# Patient Record
Sex: FEMALE | Race: BLACK OR AFRICAN AMERICAN | NOT HISPANIC OR LATINO | Employment: OTHER | URBAN - METROPOLITAN AREA
[De-identification: names, ages, dates, MRNs, and addresses within clinical notes are randomized per-mention and may not be internally consistent; named-entity substitution may affect disease eponyms.]

---

## 2023-01-19 PROBLEM — K59.00 CONSTIPATION: Status: ACTIVE | Noted: 2023-01-19

## 2023-01-19 PROBLEM — R52 BODY ACHES: Status: ACTIVE | Noted: 2023-01-19

## 2023-01-19 PROBLEM — H52.03 HYPEROPIA WITH PRESBYOPIA OF BOTH EYES: Status: ACTIVE | Noted: 2023-01-19

## 2023-01-19 PROBLEM — N17.9 ACUTE KIDNEY INJURY (CMS-HCC): Status: ACTIVE | Noted: 2023-01-19

## 2023-01-19 PROBLEM — H40.1134 PRIMARY OPEN ANGLE GLAUCOMA (POAG) OF BOTH EYES, INDETERMINATE STAGE: Status: ACTIVE | Noted: 2023-01-19

## 2023-01-19 PROBLEM — B96.81 HELICOBACTER PYLORI GASTRITIS: Status: ACTIVE | Noted: 2023-01-19

## 2023-01-19 PROBLEM — E11.65 UNCONTROLLED TYPE 2 DIABETES MELLITUS WITH HYPERGLYCEMIA (MULTI): Status: ACTIVE | Noted: 2023-01-19

## 2023-01-19 PROBLEM — K44.9 HIATAL HERNIA: Status: ACTIVE | Noted: 2023-01-19

## 2023-01-19 PROBLEM — I10 HYPERTENSION: Status: ACTIVE | Noted: 2023-01-19

## 2023-01-19 PROBLEM — M54.50 LOW BACK PAIN: Status: ACTIVE | Noted: 2023-01-19

## 2023-01-19 PROBLEM — K92.0 HEMATEMESIS: Status: ACTIVE | Noted: 2023-01-19

## 2023-01-19 PROBLEM — H26.491 PCO (POSTERIOR CAPSULAR OPACIFICATION), RIGHT: Status: ACTIVE | Noted: 2023-01-19

## 2023-01-19 PROBLEM — D64.9 ANEMIA: Status: ACTIVE | Noted: 2023-01-19

## 2023-01-19 PROBLEM — H04.123 DRY EYES, BILATERAL: Status: ACTIVE | Noted: 2023-01-19

## 2023-01-19 PROBLEM — H25.812 COMBINED FORM OF AGE-RELATED CATARACT, LEFT EYE: Status: ACTIVE | Noted: 2023-01-19

## 2023-01-19 PROBLEM — H26.492 PCO (POSTERIOR CAPSULAR OPACIFICATION), LEFT: Status: ACTIVE | Noted: 2023-01-19

## 2023-01-19 PROBLEM — R05.9 COUGH: Status: ACTIVE | Noted: 2023-01-19

## 2023-01-19 PROBLEM — E66.3 OVERWEIGHT WITH BODY MASS INDEX (BMI) OF 27 TO 27.9 IN ADULT: Status: ACTIVE | Noted: 2023-01-19

## 2023-01-19 PROBLEM — H25.811 COMBINED FORM OF AGE-RELATED CATARACT, RIGHT EYE: Status: ACTIVE | Noted: 2023-01-19

## 2023-01-19 PROBLEM — F41.9 ANXIETY: Status: ACTIVE | Noted: 2023-01-19

## 2023-01-19 PROBLEM — F32.A DEPRESSION: Status: ACTIVE | Noted: 2023-01-19

## 2023-01-19 PROBLEM — K57.90 DIVERTICULOSIS: Status: ACTIVE | Noted: 2023-01-19

## 2023-01-19 PROBLEM — N28.1 SIMPLE RENAL CYST: Status: ACTIVE | Noted: 2023-01-19

## 2023-01-19 PROBLEM — H51.8 DVD (DISSOCIATED VERTICAL DEVIATION): Status: ACTIVE | Noted: 2023-01-19

## 2023-01-19 PROBLEM — Z96.1 PSEUDOPHAKIA OF BOTH EYES: Status: ACTIVE | Noted: 2023-01-19

## 2023-01-19 PROBLEM — H50.011 ESOTROPIA OF RIGHT EYE: Status: ACTIVE | Noted: 2023-01-19

## 2023-01-19 PROBLEM — K29.70 HELICOBACTER PYLORI GASTRITIS: Status: ACTIVE | Noted: 2023-01-19

## 2023-01-19 PROBLEM — E78.5 HYPERLIPIDEMIA: Status: ACTIVE | Noted: 2023-01-19

## 2023-01-19 PROBLEM — J30.9 ALLERGIC RHINITIS: Status: ACTIVE | Noted: 2023-01-19

## 2023-01-19 PROBLEM — H52.4 HYPEROPIA WITH PRESBYOPIA OF BOTH EYES: Status: ACTIVE | Noted: 2023-01-19

## 2023-01-19 PROBLEM — K21.9 GERD (GASTROESOPHAGEAL REFLUX DISEASE): Status: ACTIVE | Noted: 2023-01-19

## 2023-01-19 PROBLEM — M25.50 ARTHRALGIA: Status: ACTIVE | Noted: 2023-01-19

## 2023-01-19 PROBLEM — Z79.4 INSULIN DEPENDENT TYPE 2 DIABETES MELLITUS (MULTI): Status: ACTIVE | Noted: 2023-01-19

## 2023-01-19 PROBLEM — K27.9 PEPTIC ULCER DISEASE: Status: ACTIVE | Noted: 2023-01-19

## 2023-01-19 PROBLEM — N18.30 CKD (CHRONIC KIDNEY DISEASE) STAGE 3, GFR 30-59 ML/MIN (MULTI): Status: ACTIVE | Noted: 2023-01-19

## 2023-01-19 PROBLEM — E11.9 DIABETES MELLITUS (MULTI): Status: ACTIVE | Noted: 2023-01-19

## 2023-01-19 PROBLEM — E11.40 DIABETIC NEUROPATHY (MULTI): Status: ACTIVE | Noted: 2023-01-19

## 2023-01-19 RX ORDER — PEN NEEDLE, DIABETIC 29 G X1/2"
NEEDLE, DISPOSABLE MISCELLANEOUS
COMMUNITY

## 2023-01-19 RX ORDER — BENZONATATE 100 MG/1
100 CAPSULE ORAL 3 TIMES DAILY PRN
COMMUNITY
Start: 2022-01-14 | End: 2023-03-07 | Stop reason: SDUPTHER

## 2023-01-19 RX ORDER — SODIUM BICARBONATE 650 MG/1
650 TABLET ORAL 2 TIMES DAILY
COMMUNITY
Start: 2021-10-27 | End: 2024-02-07 | Stop reason: ALTCHOICE

## 2023-01-19 RX ORDER — DOCUSATE SODIUM 100 MG/1
100 CAPSULE, LIQUID FILLED ORAL 2 TIMES DAILY PRN
COMMUNITY
Start: 2022-02-10

## 2023-01-19 RX ORDER — BLOOD SUGAR DIAGNOSTIC
STRIP MISCELLANEOUS
COMMUNITY
Start: 2022-04-01

## 2023-01-19 RX ORDER — GABAPENTIN 100 MG/1
200 CAPSULE ORAL NIGHTLY
COMMUNITY
Start: 2021-03-19

## 2023-01-19 RX ORDER — DULAGLUTIDE 1.5 MG/.5ML
INJECTION, SOLUTION SUBCUTANEOUS
COMMUNITY
Start: 2021-12-13 | End: 2023-03-07 | Stop reason: ALTCHOICE

## 2023-01-19 RX ORDER — PANTOPRAZOLE SODIUM 40 MG/1
40 TABLET, DELAYED RELEASE ORAL EVERY 12 HOURS
COMMUNITY
Start: 2022-01-14 | End: 2023-04-18 | Stop reason: SDUPTHER

## 2023-01-19 RX ORDER — ALBUTEROL SULFATE 90 UG/1
2 AEROSOL, METERED RESPIRATORY (INHALATION) EVERY 4 HOURS PRN
COMMUNITY
Start: 2022-05-17 | End: 2024-05-22 | Stop reason: SDUPTHER

## 2023-01-19 RX ORDER — DEXTROMETHORPHAN HYDROBROMIDE, GUAIFENESIN 5; 100 MG/5ML; MG/5ML
650 LIQUID ORAL EVERY 8 HOURS
COMMUNITY
End: 2023-03-07 | Stop reason: SDUPTHER

## 2023-01-19 RX ORDER — HYDROCHLOROTHIAZIDE 25 MG/1
12.5 TABLET ORAL DAILY
COMMUNITY
Start: 2013-01-23 | End: 2023-06-07 | Stop reason: SDUPTHER

## 2023-01-19 RX ORDER — ASPIRIN 81 MG/1
1 TABLET ORAL DAILY
COMMUNITY
Start: 2019-01-02 | End: 2023-03-07 | Stop reason: ALTCHOICE

## 2023-01-19 RX ORDER — LIDOCAINE 50 MG/G
OINTMENT TOPICAL AS NEEDED
COMMUNITY

## 2023-01-19 RX ORDER — LISINOPRIL 40 MG/1
1 TABLET ORAL DAILY
COMMUNITY
Start: 2013-01-23 | End: 2023-06-08

## 2023-01-19 RX ORDER — ATORVASTATIN CALCIUM 20 MG/1
20 TABLET, FILM COATED ORAL NIGHTLY
COMMUNITY
Start: 2017-09-21 | End: 2023-06-07 | Stop reason: SDUPTHER

## 2023-03-03 PROBLEM — I12.9 HYPERTENSIVE CKD (CHRONIC KIDNEY DISEASE): Status: ACTIVE | Noted: 2023-03-03

## 2023-03-07 ENCOUNTER — LAB (OUTPATIENT)
Dept: LAB | Facility: LAB | Age: 63
End: 2023-03-07
Payer: COMMERCIAL

## 2023-03-07 ENCOUNTER — OFFICE VISIT (OUTPATIENT)
Dept: PRIMARY CARE | Facility: CLINIC | Age: 63
End: 2023-03-07
Payer: COMMERCIAL

## 2023-03-07 VITALS
TEMPERATURE: 97.2 F | HEART RATE: 85 BPM | DIASTOLIC BLOOD PRESSURE: 76 MMHG | SYSTOLIC BLOOD PRESSURE: 116 MMHG | HEIGHT: 59 IN | OXYGEN SATURATION: 98 % | WEIGHT: 129.4 LBS | BODY MASS INDEX: 26.08 KG/M2

## 2023-03-07 DIAGNOSIS — M54.50 CHRONIC MIDLINE LOW BACK PAIN WITHOUT SCIATICA: ICD-10-CM

## 2023-03-07 DIAGNOSIS — G89.29 CHRONIC MIDLINE LOW BACK PAIN WITHOUT SCIATICA: ICD-10-CM

## 2023-03-07 DIAGNOSIS — E11.65 UNCONTROLLED TYPE 2 DIABETES MELLITUS WITH HYPERGLYCEMIA (MULTI): Primary | ICD-10-CM

## 2023-03-07 DIAGNOSIS — E11.65 UNCONTROLLED TYPE 2 DIABETES MELLITUS WITH HYPERGLYCEMIA (MULTI): ICD-10-CM

## 2023-03-07 DIAGNOSIS — N18.31 STAGE 3A CHRONIC KIDNEY DISEASE (MULTI): ICD-10-CM

## 2023-03-07 DIAGNOSIS — R05.2 SUBACUTE COUGH: ICD-10-CM

## 2023-03-07 LAB
ALBUMIN (MG/L) IN URINE: 21.9 MG/L
ALBUMIN/CREATININE (UG/MG) IN URINE: 9.2 UG/MG CRT (ref 0–30)
CREATININE (MG/DL) IN URINE: 238 MG/DL (ref 20–320)

## 2023-03-07 PROCEDURE — 3044F HG A1C LEVEL LT 7.0%: CPT | Performed by: STUDENT IN AN ORGANIZED HEALTH CARE EDUCATION/TRAINING PROGRAM

## 2023-03-07 PROCEDURE — 36415 COLL VENOUS BLD VENIPUNCTURE: CPT

## 2023-03-07 PROCEDURE — 82043 UR ALBUMIN QUANTITATIVE: CPT

## 2023-03-07 PROCEDURE — 3074F SYST BP LT 130 MM HG: CPT | Performed by: STUDENT IN AN ORGANIZED HEALTH CARE EDUCATION/TRAINING PROGRAM

## 2023-03-07 PROCEDURE — 3078F DIAST BP <80 MM HG: CPT | Performed by: STUDENT IN AN ORGANIZED HEALTH CARE EDUCATION/TRAINING PROGRAM

## 2023-03-07 PROCEDURE — 83036 HEMOGLOBIN GLYCOSYLATED A1C: CPT

## 2023-03-07 PROCEDURE — 99214 OFFICE O/P EST MOD 30 MIN: CPT | Performed by: STUDENT IN AN ORGANIZED HEALTH CARE EDUCATION/TRAINING PROGRAM

## 2023-03-07 PROCEDURE — 3066F NEPHROPATHY DOC TX: CPT | Performed by: STUDENT IN AN ORGANIZED HEALTH CARE EDUCATION/TRAINING PROGRAM

## 2023-03-07 PROCEDURE — 4010F ACE/ARB THERAPY RXD/TAKEN: CPT | Performed by: STUDENT IN AN ORGANIZED HEALTH CARE EDUCATION/TRAINING PROGRAM

## 2023-03-07 PROCEDURE — 82570 ASSAY OF URINE CREATININE: CPT

## 2023-03-07 RX ORDER — DULAGLUTIDE 3 MG/.5ML
3 INJECTION, SOLUTION SUBCUTANEOUS
Qty: 0.5 ML | Refills: 11 | Status: SHIPPED | OUTPATIENT
Start: 2023-03-07 | End: 2024-02-07 | Stop reason: SDUPTHER

## 2023-03-07 RX ORDER — BENZONATATE 100 MG/1
100 CAPSULE ORAL 3 TIMES DAILY PRN
Qty: 20 CAPSULE | Refills: 1 | Status: SHIPPED | OUTPATIENT
Start: 2023-03-07 | End: 2024-02-07 | Stop reason: ALTCHOICE

## 2023-03-07 RX ORDER — DEXTROMETHORPHAN HYDROBROMIDE, GUAIFENESIN 5; 100 MG/5ML; MG/5ML
650 LIQUID ORAL EVERY 8 HOURS
Qty: 30 TABLET | Refills: 3 | Status: SHIPPED | OUTPATIENT
Start: 2023-03-07 | End: 2023-08-11

## 2023-03-07 ASSESSMENT — PAIN SCALES - GENERAL: PAINLEVEL: 0-NO PAIN

## 2023-03-07 NOTE — PROGRESS NOTES
"03/08/23    Subjective   Patient ID: Mari Dowling is a 63 y.o. female who presents for Follow-up.    HPI    Diabetes  Lab Results   Component Value Date    HGBA1C 5.4 11/10/2022   - jardiance and trulicity  - doesn't miss any doses  - needs trulicity refills  - denies CP, SOB, LE edema      Objective   /76 (BP Location: Right arm, Patient Position: Sitting, BP Cuff Size: Adult)   Pulse 85   Temp 36.2 °C (97.2 °F) (Temporal)   Ht 1.499 m (4' 11\")   Wt 58.7 kg (129 lb 6.4 oz)   SpO2 98%   BMI 26.14 kg/m²     Physical Exam    General: well-appearing, NAD  HEENT: NC/AT  Lungs: normal work of breathing  Neuro: grossly intact  Psych: no depression, anxiety      Assessment/Plan   Problem List Items Addressed This Visit          Respiratory    Cough    Relevant Medications    benzonatate (Tessalon) 100 mg capsule       Genitourinary    CKD (chronic kidney disease) stage 3, GFR 30-59 ml/min (CMS/HCC)    Relevant Medications    empagliflozin (Jardiance) 10 mg    Other Relevant Orders    Albumin , Urine Random (Completed)       Endocrine/Metabolic    Uncontrolled type 2 diabetes mellitus with hyperglycemia (CMS/[facility]) - Primary    Relevant Medications    empagliflozin (Jardiance) 10 mg    dulaglutide (Trulicity) 3 mg/0.5 mL pen injector    Other Relevant Orders    Hemoglobin A1C    Albumin , Urine Random (Completed)       Other    Low back pain    Relevant Medications    acetaminophen (Tylenol 8 Hour) 650 mg ER tablet     Post-viral cough  - chronic, stable  - refill sent for tessalon perles    Low back pain  - chronic, stable  - refill sent for ER tylenol    Diabetes  - chronic, stable  - check A1c and urine albumin  - very well-controlled A1c  - has CKD, but will decrease jardiance to 10 mg and increase trulicity to 3 mg weekly, as patient still has overweight BMI and would continue to benefit from weight loss effects and cardiovascular protection  - consider dc jardiance at next visit, also could discuss reducing " Caller would like to discuss an/a Appointment for 07/31. Writer advised caller of callback within 24-72 hours.    Patient Name: Kaela Ko  Caller Name:   Name of Facility:   Callback Number: [phone]  Best Availability: any  Can A Detailed Message Be left? yes  Fax Number:   Additional Info: Patient called back for Covid-19 screening questions. Patient was tested positive and will be 14 days out at the 07/31 heide. Screened neg today on phone, as she does not have symptoms anymore.     Patient also asking to give Ni Reddy a heads up that she will need another Covid-19 test, as she must have a negative test to be able to return to work.    Please call if there are any questions, otherwise patient will be at 07/31 appt.  Did you confirm the message with the caller?: yes    Thank you,  Kelsey Hardwick     BP meds given very well-controlled BP  - stopped ASA    RTC in about 3 months for DM fu or sooner as needed    Russell Ramos MD

## 2023-03-09 LAB
ESTIMATED AVERAGE GLUCOSE FOR HBA1C: 105 MG/DL
HEMOGLOBIN A1C/HEMOGLOBIN TOTAL IN BLOOD: 5.3 %

## 2023-03-13 ENCOUNTER — TELEPHONE (OUTPATIENT)
Dept: PRIMARY CARE | Facility: CLINIC | Age: 63
End: 2023-03-13

## 2023-04-17 DIAGNOSIS — K21.9 GASTROESOPHAGEAL REFLUX DISEASE, UNSPECIFIED WHETHER ESOPHAGITIS PRESENT: Primary | ICD-10-CM

## 2023-04-24 RX ORDER — PANTOPRAZOLE SODIUM 40 MG/1
40 TABLET, DELAYED RELEASE ORAL
Qty: 90 TABLET | Refills: 3 | Status: SHIPPED | OUTPATIENT
Start: 2023-04-24 | End: 2023-06-07 | Stop reason: SDUPTHER

## 2023-06-07 ENCOUNTER — OFFICE VISIT (OUTPATIENT)
Dept: PRIMARY CARE | Facility: CLINIC | Age: 63
End: 2023-06-07
Payer: COMMERCIAL

## 2023-06-07 ENCOUNTER — LAB (OUTPATIENT)
Dept: LAB | Facility: LAB | Age: 63
End: 2023-06-07
Payer: COMMERCIAL

## 2023-06-07 VITALS
SYSTOLIC BLOOD PRESSURE: 130 MMHG | WEIGHT: 129.8 LBS | HEART RATE: 80 BPM | BODY MASS INDEX: 26.22 KG/M2 | OXYGEN SATURATION: 99 % | TEMPERATURE: 97.1 F | DIASTOLIC BLOOD PRESSURE: 79 MMHG

## 2023-06-07 DIAGNOSIS — E11.65 UNCONTROLLED TYPE 2 DIABETES MELLITUS WITH HYPERGLYCEMIA (MULTI): ICD-10-CM

## 2023-06-07 DIAGNOSIS — K21.9 GASTROESOPHAGEAL REFLUX DISEASE, UNSPECIFIED WHETHER ESOPHAGITIS PRESENT: ICD-10-CM

## 2023-06-07 DIAGNOSIS — I10 HYPERTENSION, UNSPECIFIED TYPE: ICD-10-CM

## 2023-06-07 DIAGNOSIS — R19.7 DIARRHEA, UNSPECIFIED TYPE: ICD-10-CM

## 2023-06-07 DIAGNOSIS — Z79.4 INSULIN DEPENDENT TYPE 2 DIABETES MELLITUS (MULTI): ICD-10-CM

## 2023-06-07 DIAGNOSIS — E11.9 INSULIN DEPENDENT TYPE 2 DIABETES MELLITUS (MULTI): ICD-10-CM

## 2023-06-07 DIAGNOSIS — R19.7 DIARRHEA, UNSPECIFIED TYPE: Primary | ICD-10-CM

## 2023-06-07 LAB
ALANINE AMINOTRANSFERASE (SGPT) (U/L) IN SER/PLAS: 21 U/L (ref 7–45)
ALBUMIN (G/DL) IN SER/PLAS: 4.4 G/DL (ref 3.4–5)
ALKALINE PHOSPHATASE (U/L) IN SER/PLAS: 92 U/L (ref 33–136)
ANION GAP IN SER/PLAS: 20 MMOL/L (ref 10–20)
ASPARTATE AMINOTRANSFERASE (SGOT) (U/L) IN SER/PLAS: 27 U/L (ref 9–39)
BILIRUBIN TOTAL (MG/DL) IN SER/PLAS: 0.4 MG/DL (ref 0–1.2)
CALCIUM (MG/DL) IN SER/PLAS: 9.4 MG/DL (ref 8.6–10.6)
CARBON DIOXIDE, TOTAL (MMOL/L) IN SER/PLAS: 23 MMOL/L (ref 21–32)
CHLORIDE (MMOL/L) IN SER/PLAS: 107 MMOL/L (ref 98–107)
CREATININE (MG/DL) IN SER/PLAS: 1.68 MG/DL (ref 0.5–1.05)
ESTIMATED AVERAGE GLUCOSE FOR HBA1C: 114 MG/DL
GFR FEMALE: 34 ML/MIN/1.73M2
GLUCOSE (MG/DL) IN SER/PLAS: 68 MG/DL (ref 74–99)
HEMOGLOBIN A1C/HEMOGLOBIN TOTAL IN BLOOD: 5.6 %
POTASSIUM (MMOL/L) IN SER/PLAS: 4.5 MMOL/L (ref 3.5–5.3)
PROTEIN TOTAL: 8.1 G/DL (ref 6.4–8.2)
SODIUM (MMOL/L) IN SER/PLAS: 145 MMOL/L (ref 136–145)
UREA NITROGEN (MG/DL) IN SER/PLAS: 31 MG/DL (ref 6–23)

## 2023-06-07 PROCEDURE — 3078F DIAST BP <80 MM HG: CPT

## 2023-06-07 PROCEDURE — 3075F SYST BP GE 130 - 139MM HG: CPT

## 2023-06-07 PROCEDURE — 36415 COLL VENOUS BLD VENIPUNCTURE: CPT

## 2023-06-07 PROCEDURE — 80053 COMPREHEN METABOLIC PANEL: CPT

## 2023-06-07 PROCEDURE — 4010F ACE/ARB THERAPY RXD/TAKEN: CPT

## 2023-06-07 PROCEDURE — 3044F HG A1C LEVEL LT 7.0%: CPT

## 2023-06-07 PROCEDURE — 83036 HEMOGLOBIN GLYCOSYLATED A1C: CPT

## 2023-06-07 PROCEDURE — 1036F TOBACCO NON-USER: CPT

## 2023-06-07 PROCEDURE — 99214 OFFICE O/P EST MOD 30 MIN: CPT

## 2023-06-07 PROCEDURE — 3066F NEPHROPATHY DOC TX: CPT

## 2023-06-07 RX ORDER — HYDROCHLOROTHIAZIDE 25 MG/1
12.5 TABLET ORAL DAILY
Qty: 90 TABLET | Refills: 3 | Status: SHIPPED | OUTPATIENT
Start: 2023-06-07 | End: 2024-04-08 | Stop reason: SDUPTHER

## 2023-06-07 RX ORDER — PANTOPRAZOLE SODIUM 40 MG/1
40 TABLET, DELAYED RELEASE ORAL
Qty: 90 TABLET | Refills: 3 | Status: SHIPPED | OUTPATIENT
Start: 2023-06-07

## 2023-06-07 RX ORDER — ATORVASTATIN CALCIUM 20 MG/1
20 TABLET, FILM COATED ORAL NIGHTLY
Qty: 90 TABLET | Refills: 3 | Status: SHIPPED | OUTPATIENT
Start: 2023-06-07 | End: 2024-03-05 | Stop reason: SDUPTHER

## 2023-06-07 ASSESSMENT — PAIN SCALES - GENERAL: PAINLEVEL: 0-NO PAIN

## 2023-06-07 NOTE — PROGRESS NOTES
Subjective   Patient ID: Mari Dowling is a 63 y.o. female who presents for Follow-up.    HPI   63 yo here for diarrhea  - watery, 6-7 episodes a day  - has been ongoing for more than 5 months but has not discussed with a provider  - started on trulicity relatively recently, and has been up-titrated to max dose, not willing to discontinue  - was told may stop jardiance at this visit   - no fevers or blood in diarrhea, nobody else has similar symptoms, no change in diet or weight changes, and has not noticed any specific triggers    Review of Systems  Ten point review of systems negative unless specified in HPI.      Objective   /79 (BP Location: Right arm, Patient Position: Sitting, BP Cuff Size: Adult)   Pulse 80   Temp 36.2 °C (97.1 °F) (Temporal)   Wt 58.9 kg (129 lb 12.8 oz)   SpO2 99%   BMI 26.22 kg/m²     Physical Exam  General:  Well developed. Alert. No acute distress.  Skin:  Warm, dry. normal skin turgor. no rashes. no lesions.   Head: NCAT  EENT: MMM  Cardiovascular:  Regular rate and rhythm, normal S1 and S2, no gallops, no murmurs and no pericardial rub.  Pulmonary:  CTAB in all fields  Abdomen:  (+) BS. soft. non-tender. non-distended. no abdominal masses. no guarding or rigidity.  Neurologic:  grossly intact  Extremities: cap refill < 2 sec bilaterally  Musculoskeletal: moves all extremities spontaneously  Psychiatric:  appropriate mood and affect    Assessment/Plan   64 yo F presenting with chronic watery diarrhea. No red flags in history with unremarkable exam. Vitals also stable, and no significant weight loss over time of complaint. Colonoscopy screening UTD, will rule out infectious causes, and occult blood loss.     Chronic Diarrhea, unspecified type  -     Comprehensive metabolic panel; Future  -     Stool Pathogen Panel, PCR; Future  -     Occult blood x 1, stool; Future  -     Ova/Para + Giardia/Cryptosporidium Antigen; Future  - Return precautions provided    Gastroesophageal  reflux disease, unspecified whether esophagitis present  -     pantoprazole (ProtoNix) 40 mg EC tablet; Take 1 tablet (40 mg) by mouth once daily in the morning. Take before meals.    Hypertension, unspecified type  -     hydroCHLOROthiazide (HYDRODiuril) 25 mg tablet; Take 0.5 tablets (12.5 mg) by mouth once daily.    Insulin dependent type 2 diabetes mellitus (CMS/[facility])  -     atorvastatin (Lipitor) 20 mg tablet; Take 1 tablet (20 mg) by mouth once daily at bedtime.  -  HbA1c    Patient discussed and seen with Attending, Dr Mer Keith.   Lori Barillas MD  PGY-1, Family Medicine.

## 2023-06-08 NOTE — PROGRESS NOTES
Ms Dowling is a 62 yo F who came to the clinic presenting with chronic diarrhea for the past year. Of note, symptoms started around the time patient began taking some of her DM medications (trulicity and Jardiance). Patient's symptoms are likely 2/2 to medications AE given history and timeline. Other differentials include parasitic/bacterial infection, IBS. Will get stool studies. Hold off on imaging or colonoscopy for now. Meanwhile, will decrease DM dosages and reassess symptoms. C/w immodium for symptomatic relief. Agree with rest of Dr. Barillas's A/P.     Discussed with Dr. Sagar Sommer, DO   PGY-3 Family Medicine

## 2023-06-14 NOTE — PROGRESS NOTES
I saw and evaluated the patient. I personally obtained the key and critical portions of the history and physical exam or was physically present for key and critical portions performed by the resident/fellow. I reviewed the resident/fellow's documentation and discussed the patient with the resident/fellow. I agree with the resident/fellow's medical decision making as documented in the note with the exception/addition of the following:  Jiardance may be implicated in diarrhea if workup is otherwise negative, and  if A1C is elevated may need some shared decision-making about effective treatment with less GI side effect     Mer Keith MD

## 2023-08-04 DIAGNOSIS — G89.29 CHRONIC MIDLINE LOW BACK PAIN WITHOUT SCIATICA: ICD-10-CM

## 2023-08-04 DIAGNOSIS — M54.50 CHRONIC MIDLINE LOW BACK PAIN WITHOUT SCIATICA: ICD-10-CM

## 2023-08-11 RX ORDER — DEXTROSE (GLUCOSE), LEVULOSE (FRUCTOSE), PHOSPHORIC ACID 1.87; 1.87; 21.5 G/5ML; G/5ML; MG/5ML
SOLUTION ORAL
Qty: 30 TABLET | Refills: 3 | Status: SHIPPED | OUTPATIENT
Start: 2023-08-11

## 2023-09-05 DIAGNOSIS — I10 PRIMARY HYPERTENSION: ICD-10-CM

## 2023-09-05 RX ORDER — LISINOPRIL 40 MG/1
TABLET ORAL
Qty: 90 TABLET | Refills: 0 | Status: SHIPPED | OUTPATIENT
Start: 2023-09-05 | End: 2023-12-19

## 2023-11-13 ENCOUNTER — APPOINTMENT (OUTPATIENT)
Dept: RADIOLOGY | Facility: HOSPITAL | Age: 63
End: 2023-11-13
Payer: COMMERCIAL

## 2023-11-13 ENCOUNTER — HOSPITAL ENCOUNTER (EMERGENCY)
Facility: HOSPITAL | Age: 63
Discharge: HOME | End: 2023-11-13
Payer: COMMERCIAL

## 2023-11-13 VITALS
BODY MASS INDEX: 25.8 KG/M2 | HEIGHT: 59 IN | RESPIRATION RATE: 16 BRPM | HEART RATE: 82 BPM | WEIGHT: 128 LBS | TEMPERATURE: 97.7 F | DIASTOLIC BLOOD PRESSURE: 80 MMHG | SYSTOLIC BLOOD PRESSURE: 131 MMHG | OXYGEN SATURATION: 100 %

## 2023-11-13 DIAGNOSIS — B34.9 VIRAL ILLNESS: Primary | ICD-10-CM

## 2023-11-13 LAB
ALBUMIN SERPL BCP-MCNC: 4.3 G/DL (ref 3.4–5)
ALP SERPL-CCNC: 75 U/L (ref 33–136)
ALT SERPL W P-5'-P-CCNC: 16 U/L (ref 7–45)
ANION GAP SERPL CALC-SCNC: 15 MMOL/L (ref 10–20)
AST SERPL W P-5'-P-CCNC: 20 U/L (ref 9–39)
BILIRUB SERPL-MCNC: 0.3 MG/DL (ref 0–1.2)
BUN SERPL-MCNC: 19 MG/DL (ref 6–23)
CALCIUM SERPL-MCNC: 9.5 MG/DL (ref 8.6–10.6)
CHLORIDE SERPL-SCNC: 104 MMOL/L (ref 98–107)
CO2 SERPL-SCNC: 22 MMOL/L (ref 21–32)
CREAT SERPL-MCNC: 1.35 MG/DL (ref 0.5–1.05)
ERYTHROCYTE [DISTWIDTH] IN BLOOD BY AUTOMATED COUNT: 15.9 % (ref 11.5–14.5)
FLUAV RNA RESP QL NAA+PROBE: NOT DETECTED
FLUBV RNA RESP QL NAA+PROBE: NOT DETECTED
GFR SERPL CREATININE-BSD FRML MDRD: 44 ML/MIN/1.73M*2
GLUCOSE SERPL-MCNC: 87 MG/DL (ref 74–99)
HCT VFR BLD AUTO: 32.7 % (ref 36–46)
HGB BLD-MCNC: 9.6 G/DL (ref 12–16)
MAGNESIUM SERPL-MCNC: 2.11 MG/DL (ref 1.6–2.4)
MCH RBC QN AUTO: 25.8 PG (ref 26–34)
MCHC RBC AUTO-ENTMCNC: 29.4 G/DL (ref 32–36)
MCV RBC AUTO: 88 FL (ref 80–100)
NRBC BLD-RTO: 0 /100 WBCS (ref 0–0)
PLATELET # BLD AUTO: 493 X10*3/UL (ref 150–450)
POTASSIUM SERPL-SCNC: 4.2 MMOL/L (ref 3.5–5.3)
PROT SERPL-MCNC: 8.8 G/DL (ref 6.4–8.2)
RBC # BLD AUTO: 3.72 X10*6/UL (ref 4–5.2)
SARS-COV-2 RNA RESP QL NAA+PROBE: NOT DETECTED
SODIUM SERPL-SCNC: 137 MMOL/L (ref 136–145)
WBC # BLD AUTO: 10.6 X10*3/UL (ref 4.4–11.3)

## 2023-11-13 PROCEDURE — 87636 SARSCOV2 & INF A&B AMP PRB: CPT | Performed by: PHYSICIAN ASSISTANT

## 2023-11-13 PROCEDURE — 96374 THER/PROPH/DIAG INJ IV PUSH: CPT

## 2023-11-13 PROCEDURE — 96361 HYDRATE IV INFUSION ADD-ON: CPT

## 2023-11-13 PROCEDURE — 85027 COMPLETE CBC AUTOMATED: CPT | Performed by: EMERGENCY MEDICINE

## 2023-11-13 PROCEDURE — 71046 X-RAY EXAM CHEST 2 VIEWS: CPT

## 2023-11-13 PROCEDURE — 83735 ASSAY OF MAGNESIUM: CPT | Performed by: EMERGENCY MEDICINE

## 2023-11-13 PROCEDURE — 80053 COMPREHEN METABOLIC PANEL: CPT | Performed by: EMERGENCY MEDICINE

## 2023-11-13 PROCEDURE — 2500000001 HC RX 250 WO HCPCS SELF ADMINISTERED DRUGS (ALT 637 FOR MEDICARE OP): Performed by: PHYSICIAN ASSISTANT

## 2023-11-13 PROCEDURE — 96375 TX/PRO/DX INJ NEW DRUG ADDON: CPT

## 2023-11-13 PROCEDURE — 99284 EMERGENCY DEPT VISIT MOD MDM: CPT | Mod: 25

## 2023-11-13 PROCEDURE — 2500000004 HC RX 250 GENERAL PHARMACY W/ HCPCS (ALT 636 FOR OP/ED): Performed by: PHYSICIAN ASSISTANT

## 2023-11-13 PROCEDURE — 36415 COLL VENOUS BLD VENIPUNCTURE: CPT | Performed by: EMERGENCY MEDICINE

## 2023-11-13 PROCEDURE — 99284 EMERGENCY DEPT VISIT MOD MDM: CPT | Performed by: PHYSICIAN ASSISTANT

## 2023-11-13 RX ORDER — KETOROLAC TROMETHAMINE 15 MG/ML
15 INJECTION, SOLUTION INTRAMUSCULAR; INTRAVENOUS ONCE
Status: COMPLETED | OUTPATIENT
Start: 2023-11-13 | End: 2023-11-13

## 2023-11-13 RX ORDER — BENZONATATE 100 MG/1
100 CAPSULE ORAL ONCE
Status: COMPLETED | OUTPATIENT
Start: 2023-11-13 | End: 2023-11-13

## 2023-11-13 RX ORDER — ONDANSETRON 4 MG/1
4 TABLET, ORALLY DISINTEGRATING ORAL EVERY 6 HOURS PRN
Qty: 15 TABLET | Refills: 0 | Status: SHIPPED | OUTPATIENT
Start: 2023-11-13 | End: 2023-11-18

## 2023-11-13 RX ORDER — ONDANSETRON HYDROCHLORIDE 2 MG/ML
4 INJECTION, SOLUTION INTRAVENOUS ONCE
Status: COMPLETED | OUTPATIENT
Start: 2023-11-13 | End: 2023-11-13

## 2023-11-13 RX ORDER — KETOROLAC TROMETHAMINE 10 MG/1
10 TABLET, FILM COATED ORAL EVERY 6 HOURS PRN
Qty: 20 TABLET | Refills: 0 | Status: SHIPPED | OUTPATIENT
Start: 2023-11-13 | End: 2024-02-07 | Stop reason: WASHOUT

## 2023-11-13 RX ORDER — ACETAMINOPHEN 325 MG/1
975 TABLET ORAL ONCE
Status: COMPLETED | OUTPATIENT
Start: 2023-11-13 | End: 2023-11-13

## 2023-11-13 RX ORDER — BENZONATATE 100 MG/1
100 CAPSULE ORAL EVERY 8 HOURS PRN
Qty: 21 CAPSULE | Refills: 0 | Status: SHIPPED | OUTPATIENT
Start: 2023-11-13 | End: 2023-11-20

## 2023-11-13 RX ADMIN — ACETAMINOPHEN 975 MG: 325 TABLET ORAL at 13:52

## 2023-11-13 RX ADMIN — ONDANSETRON 4 MG: 2 INJECTION INTRAMUSCULAR; INTRAVENOUS at 13:52

## 2023-11-13 RX ADMIN — BENZONATATE 100 MG: 100 CAPSULE ORAL at 13:52

## 2023-11-13 RX ADMIN — SODIUM CHLORIDE, SODIUM LACTATE, POTASSIUM CHLORIDE, AND CALCIUM CHLORIDE 1000 ML: 600; 310; 30; 20 INJECTION, SOLUTION INTRAVENOUS at 13:45

## 2023-11-13 RX ADMIN — KETOROLAC TROMETHAMINE 15 MG: 15 INJECTION, SOLUTION INTRAMUSCULAR; INTRAVENOUS at 13:52

## 2023-11-13 ASSESSMENT — COLUMBIA-SUICIDE SEVERITY RATING SCALE - C-SSRS
1. IN THE PAST MONTH, HAVE YOU WISHED YOU WERE DEAD OR WISHED YOU COULD GO TO SLEEP AND NOT WAKE UP?: NO
2. HAVE YOU ACTUALLY HAD ANY THOUGHTS OF KILLING YOURSELF?: NO
6. HAVE YOU EVER DONE ANYTHING, STARTED TO DO ANYTHING, OR PREPARED TO DO ANYTHING TO END YOUR LIFE?: NO

## 2023-11-13 ASSESSMENT — PAIN - FUNCTIONAL ASSESSMENT
PAIN_FUNCTIONAL_ASSESSMENT: 0-10
PAIN_FUNCTIONAL_ASSESSMENT: 0-10

## 2023-11-13 ASSESSMENT — PAIN SCALES - GENERAL
PAINLEVEL_OUTOF10: 10 - WORST POSSIBLE PAIN
PAINLEVEL_OUTOF10: 10 - WORST POSSIBLE PAIN

## 2023-11-13 NOTE — ED PROVIDER NOTES
HPI   Chief Complaint   Patient presents with    Flu Symptoms    Back Pain       This is a 63-year-old female with a history of diabetes on Trulicity, hypertension, GERD, chronic diarrhea, CKD 3 who presents to the emergency department with concern for an approximate week history of diffuse back pain, productive cough, rhinorrhea, fatigue, headache, nausea without emesis.  Denies any recent sick contact or travel.  Has been taking Tylenol without adequate relief of symptoms.  She endorses chronic diarrhea no different than usual for which she takes Imodium.  She has no fever, chills, chest pain, shortness of breath, loss of taste or smell, abdominal pain, urinary symptoms.                          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    Alternating esotropia    Cortical age-related cataract, bilateral 11/29/2016    Cortical age-related cataract of both eyes    Encounter for follow-up examination after completed treatment for conditions other than malignant neoplasm 12/08/2020    Hospital discharge follow-up    Encounter for general adult medical examination without abnormal findings 04/01/2022    Encounter for annual wellness visit (AWV) in Medicare patient    Encounter for immunization 03/12/2021    Immunization due    Encounter for issue of repeat prescription 04/25/2017    Medication refill    Encounter for other screening for malignant neoplasm of breast 11/05/2018    Screening for breast cancer    Essential (primary) hypertension 04/30/2021    Hypertension    Foreign body in right ear, initial encounter 04/25/2017    Foreign body of right ear, initial encounter    Impacted cerumen, right ear 04/25/2017    Impacted cerumen of right ear    Otalgia, right ear 10/26/2016    Otalgia, right    Other retention of urine 01/11/2021    Acute urinary  retention    Other specified health status 2019    Measles, mumps, rubella (MMR) vaccination status unknown    Pain in right elbow 2017    Right elbow pain    Personal history of other diseases of the digestive system 2018    History of gastroenteritis    Personal history of other diseases of the nervous system and sense organs 2016    History of hyperopia    Personal history of other drug therapy 10/15/2019    History of influenza vaccination    Personal history of urinary (tract) infections 2017    History of UTI    Presbyopia 2016    Presbyopia OU    Type 2 diabetes mellitus without complications (CMS/HCC) 10/11/2015    Diabetes mellitus type 2, controlled    Unspecified astigmatism, bilateral 2022    Astigmatism, bilateral    Unspecified disorder of refraction 10/13/2014    Refractive error    Viral infection, unspecified 2020    Nonspecific syndrome suggestive of viral illness     Past Surgical History:   Procedure Laterality Date    CATARACT EXTRACTION  2017    Cataract Extraction    HYSTERECTOMY  2018    Hysterectomy    OTHER SURGICAL HISTORY  2021     section     Family History   Problem Relation Name Age of Onset    Hypertension Mother      Lung cancer Father      Other (Diabetes Mellitus) Sister      Colon cancer Father's Sister      Colon cancer Paternal Grandmother      Breast cancer Other Aunt      Social History     Tobacco Use    Smoking status: Never    Smokeless tobacco: Never   Substance Use Topics    Alcohol use: Never    Drug use: Never       Physical Exam   ED Triage Vitals   Temp Heart Rate Resp BP   23 1158 23 1158 23 1158 23 1201   36.5 °C (97.7 °F) 82 16 131/80      SpO2 Temp src Heart Rate Source Patient Position   23 1158 -- -- --   100 %         BP Location FiO2 (%)     -- --             Physical Exam  Vitals reviewed.   Constitutional:       Appearance: Normal appearance.   HENT:       Head: Normocephalic and atraumatic.      Nose: Congestion present.   Eyes:      Extraocular Movements: Extraocular movements intact.      Pupils: Pupils are equal, round, and reactive to light.   Cardiovascular:      Rate and Rhythm: Normal rate and regular rhythm.      Heart sounds: Normal heart sounds.   Pulmonary:      Effort: Pulmonary effort is normal.      Breath sounds: Normal breath sounds.   Abdominal:      General: There is no distension.      Palpations: Abdomen is soft.      Tenderness: There is no abdominal tenderness.   Musculoskeletal:      Cervical back: Normal range of motion and neck supple.      Comments: Diffuse bilateral parathoracic/paralumbar tenderness, full range of motion of all 4 extremities with equal strength, ambulating independently   Neurological:      Mental Status: She is alert.         ED Course & MDM   Diagnoses as of 11/14/23 1710   Viral illness       Medical Decision Making  63-year-old female, is alert and oriented x3, afebrile and hemodynamically stable presenting with multiple symptoms as per HPI.    The patient is otherwise in no apparent distress, speaking full sentences.  Cardiac exam regular rate and rhythm without any murmurs, rubs or gallops.  Chest clear bilaterally.  Abdomen is soft and nontender.    The patient was given Toradol, acetaminophen, benzonatate, Zofran and a liter of LR, blood drawn for CBC, CMP, magnesium and nasopharyngeal collection obtained for COVID-19 and influenza testing.  Chest x-ray ordered to rule out pneumonia.    Chest x-ray negative for acute cardiopulmonary pathologies.  Laboratory work-up grossly unremarkable.  COVID-19/influenza negative.    Reevaluated with improvement of symptoms.  At this time, the patient is stable for discharge home.  She was discharged with a prescription for antiemetics, analgesics, PCP follow-up advised.        Procedure  Procedures     Constantino Zafar PA-C  11/14/23 1712

## 2023-11-13 NOTE — ED TRIAGE NOTES
Patient has been having back pain x1 week, having difficulty laying /sitting. No problems with using bathroom. Patient is also complaining of a cough/ body aches/headache x1 week. Hx HTN - takes medication. Patient is also complaining of nausea/vomiting no abdominal pain

## 2023-11-14 ENCOUNTER — TELEPHONE (OUTPATIENT)
Dept: PHARMACY | Facility: HOSPITAL | Age: 63
End: 2023-11-14
Payer: COMMERCIAL

## 2023-11-14 NOTE — TELEPHONE ENCOUNTER
Population Health: Outreach by Ambulatory Pharmacy Team    Payor: [employer] [place]  Reason: Adherence  Medication: Atorvastatin 20 mg  Outcome: Patient Reached: Declines Discussion; patient disconnected the call    Kemar CarcamoD

## 2023-11-29 ENCOUNTER — DOCUMENTATION (OUTPATIENT)
Dept: PRIMARY CARE | Facility: CLINIC | Age: 63
End: 2023-11-29
Payer: COMMERCIAL

## 2023-11-29 NOTE — PROGRESS NOTES
Rodríguez  rep called requesting pt rx on file and last 6 months medical records. Call placed to pt to verify record release. Pt confirms company supplies [employer] and it is ok to send over requested documentation. Message sent to provider.

## 2023-12-19 DIAGNOSIS — I10 PRIMARY HYPERTENSION: ICD-10-CM

## 2023-12-19 RX ORDER — LISINOPRIL 40 MG/1
TABLET ORAL
Qty: 90 TABLET | Refills: 0 | Status: SHIPPED | OUTPATIENT
Start: 2023-12-19 | End: 2024-04-08 | Stop reason: SDUPTHER

## 2024-02-07 ENCOUNTER — TELEMEDICINE (OUTPATIENT)
Dept: PRIMARY CARE | Facility: CLINIC | Age: 64
End: 2024-02-07
Payer: COMMERCIAL

## 2024-02-07 VITALS — HEIGHT: 59 IN | BODY MASS INDEX: 25.85 KG/M2

## 2024-02-07 DIAGNOSIS — E11.65 UNCONTROLLED TYPE 2 DIABETES MELLITUS WITH HYPERGLYCEMIA (MULTI): ICD-10-CM

## 2024-02-07 DIAGNOSIS — N18.32 STAGE 3B CHRONIC KIDNEY DISEASE (MULTI): Primary | ICD-10-CM

## 2024-02-07 PROBLEM — Z79.4 INSULIN DEPENDENT TYPE 2 DIABETES MELLITUS (MULTI): Status: RESOLVED | Noted: 2023-01-19 | Resolved: 2024-02-07

## 2024-02-07 PROBLEM — E11.9 INSULIN DEPENDENT TYPE 2 DIABETES MELLITUS (MULTI): Status: RESOLVED | Noted: 2023-01-19 | Resolved: 2024-02-07

## 2024-02-07 PROCEDURE — 99213 OFFICE O/P EST LOW 20 MIN: CPT | Performed by: STUDENT IN AN ORGANIZED HEALTH CARE EDUCATION/TRAINING PROGRAM

## 2024-02-07 PROCEDURE — 4010F ACE/ARB THERAPY RXD/TAKEN: CPT | Performed by: STUDENT IN AN ORGANIZED HEALTH CARE EDUCATION/TRAINING PROGRAM

## 2024-02-07 PROCEDURE — 1036F TOBACCO NON-USER: CPT | Performed by: STUDENT IN AN ORGANIZED HEALTH CARE EDUCATION/TRAINING PROGRAM

## 2024-02-07 RX ORDER — DULAGLUTIDE 3 MG/.5ML
3 INJECTION, SOLUTION SUBCUTANEOUS
Qty: 0.5 ML | Refills: 2 | Status: SHIPPED | OUTPATIENT
Start: 2024-02-07 | End: 2024-06-06 | Stop reason: SDUPTHER

## 2024-02-07 RX ORDER — HYDROCORTISONE 25 MG/G
OINTMENT TOPICAL
COMMUNITY
Start: 2016-06-08

## 2024-02-07 ASSESSMENT — ENCOUNTER SYMPTOMS
WEAKNESS: 0
HEMATURIA: 0
LIGHT-HEADEDNESS: 0
HEADACHES: 0
POLYDIPSIA: 1
DIAPHORESIS: 0
PALPITATIONS: 0

## 2024-02-07 ASSESSMENT — PAIN SCALES - GENERAL: PAINLEVEL: 0-NO PAIN

## 2024-02-07 NOTE — PROGRESS NOTES
Subjective   Patient ID: Mari Dowling is a 64 y.o. female who presents for No chief complaint on file..    HPI   Mari Dowling 65yo F, w/ PMHx. HLD, NIDDM2, CKD 3b A1 who presents for a virtual visit. Would like a refill of her Trulicity. No acute complaints.       Review of Systems   Constitutional:  Negative for diaphoresis.   Eyes:  Negative for visual disturbance.   Cardiovascular:  Negative for chest pain and palpitations.   Endocrine: Positive for polydipsia. Negative for polyuria.   Genitourinary:  Negative for hematuria.   Neurological:  Negative for syncope, weakness, light-headedness and headaches.       Objective   There were no vitals taken for this visit.    Physical Exam  Constitutional:       General: She is not in acute distress.     Appearance: She is not ill-appearing or toxic-appearing.   HENT:      Head: Normocephalic and atraumatic.      Right Ear: External ear normal.      Left Ear: External ear normal.   Eyes:      General: No scleral icterus.     Extraocular Movements: Extraocular movements intact.      Conjunctiva/sclera: Conjunctivae normal.   Neurological:      Mental Status: She is alert.   Psychiatric:         Mood and Affect: Mood normal.         Assessment/Plan     Mari Dowling 65yo F, w/ PMHx. HLD, NIDDM2, CKD 3b A1 who presents for a virtual visit. Would like a refill of her Trulicity.     Labs Ordered: RFP, Albumin Spot, A1C, Lipid Panel.     #NIDDM2  - currently on Trulicity,   - HgA1C: 5.6 6/23  - Endorses increased thirst but no change in urination  - Reviewed w/ patient concerns that come with tight glycemic control (previous A1C 5.3, 5.4), previous CMP/RFP have a history of mild hypoglycemia noted. She has had medications taken off of her list (SGLT2, Metformin  however she has concerns regarding adjusting her regiment at all  - Reviewed the benefits of GLP1, CVD  - Trulicity 3mg 1x a week refilled for 60 days until she is seen by her PCP.     #CKD 3b A1  Patient requests  refill of Torodal, review w/ patient she has CKD which she was unaware of. Reviewed blood work and labs with her regarding CKD.   - previously on Jardiance, taken off the medication.   - currently on     #HTN  - Goal BP: requires ASCVD to define 130/80 vs 140/90,   - Patient denies HA, VC, Palpitations, CP, LOC/Neurologic Deficits  -> endorses complaince w/ taking HTN medication; hydrochlorothiazide 12.5 daily, Lisinopril 40mgs Daily.   - Asx., she does not take BP at home. Thus we will wait until she is in clinic next to see if further adjustment is needed. (Given CKD, she may be a good candidate for Chlorthalidone if pharmaceutical adjustment is required.    #GERD  - still having have some symptoms.  - has bad symptoms for years >5-10 years.   - States she has history of hiatal hernia as well,   - recommended patient to complete UGI Endoscopy, however she would like to discuss this with Dr. Barillas at her next visit.  - Chart search found that patient was previously found to decline UGI as recommended by provider.     # Aches and pains  -Intermittent, patient thinks are secondary to URI from 11/13/2023.   -Reviewed with patient and she says her symptoms resolved thereafter but have been on and off since that time  -She cannot further indicate which joints and where she is sore saying it can be all over.  -She endorses walking regularly  -She requests refill of Toradol provided at visit for URI in 11/13/2023  -Reviewed with patient because of her chronic kidney disease Toradol would not be an ideal medication for her.   -Instructed patient to continue with lidocaine as well as Tylenol as needed for pain or soreness  -Reviewed with patient that she will require an in office visit for further evaluation of these pains as it is likely not to be related to URI given that those symptoms were 4 months ago.       Sent patient's PCP, Dr. Barillas a letter discussing our visit and some of the recommendations I have for the patient  for her next appointment in person with him.     I close awesome clerical staff were tasked with scheduling an in person appointment for patient to be seen by PCP.     RTC in 1-2 months for FU or as needed.   - After discussion w/ patient she would like to discuss any changes in meds w/ Dr. Barillas. Suggestions check BP, consider chlorthalidone to hydrochlorothiazide, if no concern for hypotension (or needs further control of BP)   - Depending on labs calculate ASCVD and from that determine BP goal  - Pending labs: I may drop the dose of her trulicity to 1.5 from 3, as there is concern for some hypoglycemia, through review of labs.   - Colonoscopy due this year, last completed in 2014.   - next visit is IO; discuss w/ patient she can complete RSV vaccine at [facility] pharmacy, and review if her vaccines are uptodate.       Discussed w/ Dr. Veliz.     Miriam Linton MD  FM & PM Resident

## 2024-02-08 NOTE — PROGRESS NOTES
I reviewed the resident/fellow's documentation and discussed the patient with the resident/fellow. I agree with the resident/fellow's medical decision making as documented in the note.     Negra Veliz MD

## 2024-03-05 DIAGNOSIS — E11.9 INSULIN DEPENDENT TYPE 2 DIABETES MELLITUS (MULTI): ICD-10-CM

## 2024-03-05 DIAGNOSIS — Z79.4 INSULIN DEPENDENT TYPE 2 DIABETES MELLITUS (MULTI): ICD-10-CM

## 2024-03-05 RX ORDER — ATORVASTATIN CALCIUM 20 MG/1
20 TABLET, FILM COATED ORAL NIGHTLY
Qty: 90 TABLET | Refills: 1 | Status: SHIPPED | OUTPATIENT
Start: 2024-03-05 | End: 2024-04-08 | Stop reason: SDUPTHER

## 2024-03-05 NOTE — TELEPHONE ENCOUNTER
Refill Atorvastatin. Lipid Panel ordered and pedning collection. Stable on 20mgs for prolonged time. Will refill and discuss completing blood work at our FU apt.     Reviewed and approved by MAXI MACKEY on 3/5/24 at 2:33 PM.

## 2024-04-08 ENCOUNTER — OFFICE VISIT (OUTPATIENT)
Dept: PRIMARY CARE | Facility: CLINIC | Age: 64
End: 2024-04-08
Payer: COMMERCIAL

## 2024-04-08 VITALS
RESPIRATION RATE: 18 BRPM | BODY MASS INDEX: 26.43 KG/M2 | HEART RATE: 72 BPM | SYSTOLIC BLOOD PRESSURE: 139 MMHG | WEIGHT: 131.1 LBS | OXYGEN SATURATION: 100 % | DIASTOLIC BLOOD PRESSURE: 85 MMHG | HEIGHT: 59 IN | TEMPERATURE: 97.1 F

## 2024-04-08 DIAGNOSIS — I10 PRIMARY HYPERTENSION: ICD-10-CM

## 2024-04-08 DIAGNOSIS — I10 HYPERTENSION, UNSPECIFIED TYPE: ICD-10-CM

## 2024-04-08 DIAGNOSIS — H40.10X0 OPEN-ANGLE GLAUCOMA, UNSPECIFIED GLAUCOMA STAGE, UNSPECIFIED LATERALITY, UNSPECIFIED OPEN-ANGLE GLAUCOMA TYPE: ICD-10-CM

## 2024-04-08 DIAGNOSIS — R19.7 DIARRHEA, UNSPECIFIED TYPE: ICD-10-CM

## 2024-04-08 DIAGNOSIS — Z79.4 INSULIN DEPENDENT TYPE 2 DIABETES MELLITUS (MULTI): ICD-10-CM

## 2024-04-08 DIAGNOSIS — R09.89 BRUIT OF LEFT CAROTID ARTERY: ICD-10-CM
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
## 2024-04-08 DIAGNOSIS — Z12.11 ENCOUNTER FOR SCREENING FOR MALIGNANT NEOPLASM OF COLON: Primary | ICD-10-CM

## 2024-04-08 DIAGNOSIS — R06.09 DYSPNEA ON EXERTION: ICD-10-CM

## 2024-04-08 DIAGNOSIS — M25.562 ARTHRALGIA OF BOTH KNEES: ICD-10-CM

## 2024-04-08 DIAGNOSIS — E11.9 INSULIN DEPENDENT TYPE 2 DIABETES MELLITUS (MULTI): ICD-10-CM

## 2024-04-08 DIAGNOSIS — M25.561 ARTHRALGIA OF BOTH KNEES: ICD-10-CM

## 2024-04-08 PROCEDURE — 99214 OFFICE O/P EST MOD 30 MIN: CPT

## 2024-04-08 PROCEDURE — 3079F DIAST BP 80-89 MM HG: CPT

## 2024-04-08 PROCEDURE — 4010F ACE/ARB THERAPY RXD/TAKEN: CPT

## 2024-04-08 PROCEDURE — 3075F SYST BP GE 130 - 139MM HG: CPT

## 2024-04-08 PROCEDURE — 1036F TOBACCO NON-USER: CPT

## 2024-04-08 RX ORDER — ATORVASTATIN CALCIUM 40 MG/1
40 TABLET, FILM COATED ORAL NIGHTLY
Qty: 90 TABLET | Refills: 3 | Status: SHIPPED | OUTPATIENT
Start: 2024-04-08

## 2024-04-08 RX ORDER — LISINOPRIL 40 MG/1
40 TABLET ORAL DAILY
Qty: 90 TABLET | Refills: 3 | Status: SHIPPED | OUTPATIENT
Start: 2024-04-08

## 2024-04-08 RX ORDER — HYDROCHLOROTHIAZIDE 25 MG/1
12.5 TABLET ORAL DAILY
Qty: 90 TABLET | Refills: 3 | Status: SHIPPED | OUTPATIENT
Start: 2024-04-08

## 2024-04-08 ASSESSMENT — PAIN SCALES - GENERAL: PAINLEVEL: 0-NO PAIN

## 2024-04-08 NOTE — PROGRESS NOTES
Subjective   Patient ID: Mari Dowling is a 64 y.o. female with PMH of htn, t2dm, and chronic diarrhea who presents for Follow-up.    HPI  #Hypertension  BP Readings from Last 3 Encounters:   04/08/24 139/85   11/13/23 131/80   06/07/23 130/79   - meds: lisinopril 40 mg, hct 12.5 mg daily  - missing doses: denies  - taking BP at home?: yes; 120s-130s  - smoking: never  - denies HA, chest pains, SOB, LE edema, vision changes    #Chronic diarrhea  - persistent, watery   - no fevers  - has not changed since last visit, did not get stool studies done  - denies dark or blood stools  - unable to identify triggers  - weight stable    #Falls  #Dyspnea on exertion  - progressive  - unable to walk a block, do chores, or climb a flight of stairs without stopping  - has also had multiple falls due to small objects on ground  - denies lightheadedness, dizziness, palpitations, or chest pain    Patient Active Problem List   Diagnosis    Acute kidney injury (CMS/HCC)    Allergic rhinitis    Anemia    Anxiety    Arthralgia    CKD (chronic kidney disease) stage 3, GFR 30-59 ml/min (CMS/HCC)    Constipation    Cough    Depression    Diabetic neuropathy (CMS/HCC)    Diverticulosis    Dry eyes, bilateral    DVD (dissociated vertical deviation)    Esotropia of right eye    GERD (gastroesophageal reflux disease)    Helicobacter pylori gastritis    Hematemesis    Hiatal hernia    Hyperlipidemia    Hypertension    Combined form of age-related cataract, left eye    Combined form of age-related cataract, right eye    PCO (posterior capsular opacification), left    PCO (posterior capsular opacification), right    Peptic ulcer disease    Primary open angle glaucoma (POAG) of both eyes, indeterminate stage    Pseudophakia of both eyes    Simple renal cyst    Body aches    Hyperopia with presbyopia of both eyes    Overweight with body mass index (BMI) of 27 to 27.9 in adult    Low back pain    Hypertensive CKD (chronic kidney disease)     "  Current Outpatient Medications   Medication Instructions    8 Hour Pain Reliever 650 mg ER tablet TAKE 1 TABLET(650 MG) BY MOUTH EVERY MORNING, 1 TABLET AT NOON AND 1 TABLET AT BEDTIME. DO NOT CRUSH, CHEW, OR SPLIT    albuterol 90 mcg/actuation inhaler 2 puffs, inhalation, Every 4 hours PRN    atorvastatin (LIPITOR) 20 mg, oral, Nightly    docusate sodium (COLACE) 100 mg, oral, 2 times daily PRN    FREESTYLE LANCETS MISC miscellaneous, Daily    gabapentin (NEURONTIN) 200 mg, oral, Nightly    hydroCHLOROthiazide (HYDRODIURIL) 12.5 mg, oral, Daily    hydrocortisone 2.5 % ointment 1 Application    lidocaine (Xylocaine) 5 % ointment Topical, As needed, Apple To Effected Area As Directed    lisinopril 40 mg tablet TAKE 1 TABLET BY MOUTH EVERY DAY    OneTouch Ultra Test strip USE AS DIRECTED.    pantoprazole (PROTONIX) 40 mg, oral, Daily before breakfast    pen needle /2\" 29G X 12mm needle Use as instructed    Trulicity 3 mg, subcutaneous, Every 7 days      Past Surgical History:   Procedure Laterality Date    CATARACT EXTRACTION  2017    Cataract Extraction    HYSTERECTOMY  2018    Hysterectomy    OTHER SURGICAL HISTORY  2021     section      No Known Allergies   Social History     Tobacco Use    Smoking status: Never    Smokeless tobacco: Never   Substance Use Topics    Alcohol use: Never    Drug use: Never      Family History   Problem Relation Name Age of Onset    Hypertension Mother      Lung cancer Father      Other (Diabetes Mellitus) Sister      Colon cancer Father's Sister      Colon cancer Paternal Grandmother      Breast cancer Other Aunt         Review of Systems  Ten point review of systems negative unless specified in HPI.     Objective   Vitals: /85 (BP Location: Left arm, Patient Position: Sitting, BP Cuff Size: Adult)   Pulse 72   Temp 36.2 °C (97.1 °F) (Temporal)   Resp 18   Ht 1.499 m (4' 11\")   Wt 59.5 kg (131 lb 1.6 oz)   SpO2 100%   BMI 26.48 kg/m²  "     Physical Exam  Constitutional:       Appearance: Normal appearance.   HENT:      Head: Normocephalic and atraumatic.   Eyes:      Extraocular Movements: Extraocular movements intact.      Pupils: Pupils are equal, round, and reactive to light.   Neck:      Vascular: Carotid bruit present.     Cardiovascular:      Rate and Rhythm: Normal rate and regular rhythm.   Pulmonary:      Effort: Pulmonary effort is normal.      Breath sounds: Normal breath sounds and air entry.   Abdominal:      General: Abdomen is flat. Bowel sounds are normal. There is no distension.      Tenderness: There is no abdominal tenderness.   Musculoskeletal:         General: No swelling or tenderness. Normal range of motion.      Right lower leg: No edema.      Left lower leg: No edema.   Skin:     General: Skin is warm and dry.      Capillary Refill: Capillary refill takes less than 2 seconds.   Neurological:      General: No focal deficit present.      Mental Status: She is alert.   Psychiatric:         Mood and Affect: Mood normal.         Behavior: Behavior normal.         Assessment/Plan   63 yo F with a PMH of t2dm, htn, and OA presenting for follow-up. Vitals stable, exam unremarkable. New dyspnea on exertion concerning for CHF with no prior pulmonary disease or risk factors. Will order echo. Patient does also have a significant number of falls which may be mechanical, however, history is very vague. Wwith hx of eye disease, recommended f/up with ophtho to r/o worsening vision; will also get carotid duplex with left carotid bruit auscultated. Unclear cause of chronic, persistent diarrhea, but cscope and stool studies were recommended. She will likely also benefit from GI referral.    Encounter for screening for malignant neoplasm of colon  Chronic diarrhea  -     Colonoscopy Screening; Average Risk Patient; Future    Insulin dependent type 2 diabetes mellitus (CMS/HCC)  -     atorvastatin (Lipitor) 40 mg tablet; Take 1 tablet (40 mg)  by mouth once daily at bedtime.  -     Referral to Ophthalmology; Future  -     Magnesium; Future    Hypertension, unspecified type  -     hydroCHLOROthiazide (HYDRODiuril) 25 mg tablet; Take 0.5 tablets (12.5 mg) by mouth once daily.    Primary hypertension  -     lisinopril 40 mg tablet; Take 1 tablet (40 mg) by mouth once daily.    Dyspnea on exertion  -     Referral to Physical Therapy; Future  -     Transthoracic echo (TTE) complete; Future    Arthralgia of both knees  -     Referral to Physical Therapy; Future  -     Transthoracic echo (TTE) complete; Future    Fall, subsequent encounter  -     Vascular US carotid artery duplex bilateral; Future  -     Referral to Ophthalmology; Future    Bruit of left carotid artery  -     Vascular US carotid artery duplex bilateral; Future    Open-angle glaucoma, unspecified glaucoma stage, unspecified laterality, unspecified open-angle glaucoma type  -     Referral to Ophthalmology; Future    Diarrhea, unspecified type  -     CBC and Auto Differential; Future      RTC in 1 months, or earlier as needed.  Attending Supervision: seen and discussed with attending physician Dr. Low Zarate.  Lori Barillas MD  PGY-2, Family Medicine.  '

## 2024-04-08 NOTE — PATIENT INSTRUCTIONS
If we placed a referral today for a specialist/procedure and you did not otherwise receive a phone number to schedule, please call the general scheduling line at [phone]. You may also schedule appointments on the [employer] lora.     To schedule an appointment with the eye doctor (I.e. ophthalmologist), please call [phone].     To schedule your imaging test (echocardiogram (heart ultrasound) and neck ultrasound), please call [phone].

## 2024-04-09 NOTE — PROGRESS NOTES
I saw and evaluated the patient. I personally obtained the key and critical portions of the history and physical exam or was physically present for key and critical portions performed by the resident/fellow. I reviewed the resident/fellow's documentation and discussed the patient with the resident/fellow. I agree with the resident/fellow's medical decision making as documented in the note.    Low Zarate MD

## 2024-04-18 DIAGNOSIS — Z12.11 COLON CANCER SCREENING: Primary | ICD-10-CM

## 2024-04-18 RX ORDER — POLYETHYLENE GLYCOL-3350 AND ELECTROLYTES WITH FLAVOR PACK 240; 5.84; 2.98; 6.72; 22.72 G/278.26G; G/278.26G; G/278.26G; G/278.26G; G/278.26G
4000 POWDER, FOR SOLUTION ORAL ONCE
Qty: 4000 ML | Refills: 0 | Status: SHIPPED | OUTPATIENT
Start: 2024-04-18 | End: 2024-04-18

## 2024-04-25 ENCOUNTER — HOSPITAL ENCOUNTER (OUTPATIENT)
Dept: VASCULAR MEDICINE | Facility: HOSPITAL | Age: 64
Discharge: HOME | End: 2024-04-25
Payer: COMMERCIAL
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
## 2024-04-25 DIAGNOSIS — R09.89 BRUIT OF LEFT CAROTID ARTERY: ICD-10-CM

## 2024-04-25 PROCEDURE — 93880 EXTRACRANIAL BILAT STUDY: CPT

## 2024-04-25 PROCEDURE — 93880 EXTRACRANIAL BILAT STUDY: CPT | Performed by: SURGERY

## 2024-04-26 DIAGNOSIS — I65.22 INTERNAL CAROTID ARTERY STENOSIS, LEFT: Primary | ICD-10-CM

## 2024-04-26 NOTE — PROGRESS NOTES
Secure chat received regarding carotid duplex results:  > 70% stenosis of L ICA and < 50% on right side, with no other significant flow obstructions bilaterally.     Referral to vascular surgery placed.     The results and their significance were discussed with the patient. She endorsed understanding, and was agreeable with the plan to follow-up with vascular surgery. She does also have follow-up scheduled at [facility] [place] on 5/8/24.     Lori Barillas MD  PGY-2, Family Medicine.

## 2024-05-01 ENCOUNTER — APPOINTMENT (OUTPATIENT)
Dept: PRIMARY CARE | Facility: CLINIC | Age: 64
End: 2024-05-01
Payer: COMMERCIAL

## 2024-05-08 ENCOUNTER — LAB (OUTPATIENT)
Dept: LAB | Facility: LAB | Age: 64
End: 2024-05-08
Payer: COMMERCIAL

## 2024-05-08 ENCOUNTER — OFFICE VISIT (OUTPATIENT)
Dept: PRIMARY CARE | Facility: CLINIC | Age: 64
End: 2024-05-08
Payer: COMMERCIAL

## 2024-05-08 VITALS
WEIGHT: 130 LBS | HEART RATE: 87 BPM | BODY MASS INDEX: 26.21 KG/M2 | SYSTOLIC BLOOD PRESSURE: 152 MMHG | DIASTOLIC BLOOD PRESSURE: 83 MMHG | TEMPERATURE: 97.2 F | HEIGHT: 59 IN | OXYGEN SATURATION: 99 %

## 2024-05-08 DIAGNOSIS — R19.7 DIARRHEA, UNSPECIFIED TYPE: Primary | ICD-10-CM

## 2024-05-08 DIAGNOSIS — E11.9 INSULIN DEPENDENT TYPE 2 DIABETES MELLITUS (MULTI): ICD-10-CM

## 2024-05-08 DIAGNOSIS — R19.7 DIARRHEA, UNSPECIFIED TYPE: ICD-10-CM

## 2024-05-08 DIAGNOSIS — E11.65 UNCONTROLLED TYPE 2 DIABETES MELLITUS WITH HYPERGLYCEMIA (MULTI): ICD-10-CM

## 2024-05-08 DIAGNOSIS — Z79.4 INSULIN DEPENDENT TYPE 2 DIABETES MELLITUS (MULTI): ICD-10-CM

## 2024-05-08 LAB
ALBUMIN SERPL BCP-MCNC: 4.6 G/DL (ref 3.4–5)
ANION GAP SERPL CALC-SCNC: 19 MMOL/L (ref 10–20)
BASOPHILS # BLD AUTO: 0.11 X10*3/UL (ref 0–0.1)
BASOPHILS NFR BLD AUTO: 1.3 %
BUN SERPL-MCNC: 23 MG/DL (ref 6–23)
CALCIUM SERPL-MCNC: 9.6 MG/DL (ref 8.6–10.6)
CHLORIDE SERPL-SCNC: 109 MMOL/L (ref 98–107)
CHOLEST SERPL-MCNC: 132 MG/DL (ref 0–199)
CHOLESTEROL/HDL RATIO: 2.7
CO2 SERPL-SCNC: 16 MMOL/L (ref 21–32)
CREAT SERPL-MCNC: 1.24 MG/DL (ref 0.5–1.05)
CREAT UR-MCNC: 99.9 MG/DL (ref 20–320)
CRP SERPL-MCNC: <0.1 MG/DL
EGFRCR SERPLBLD CKD-EPI 2021: 49 ML/MIN/1.73M*2
EOSINOPHIL # BLD AUTO: 0.67 X10*3/UL (ref 0–0.7)
EOSINOPHIL NFR BLD AUTO: 8 %
ERYTHROCYTE [DISTWIDTH] IN BLOOD BY AUTOMATED COUNT: 15.4 % (ref 11.5–14.5)
ERYTHROCYTE [SEDIMENTATION RATE] IN BLOOD BY WESTERGREN METHOD: 63 MM/H (ref 0–30)
EST. AVERAGE GLUCOSE BLD GHB EST-MCNC: 103 MG/DL
GLUCOSE SERPL-MCNC: 74 MG/DL (ref 74–99)
HBA1C MFR BLD: 5.2 %
HCT VFR BLD AUTO: 33.1 % (ref 36–46)
HDLC SERPL-MCNC: 49.4 MG/DL
HGB BLD-MCNC: 9.3 G/DL (ref 12–16)
IMM GRANULOCYTES # BLD AUTO: 0.01 X10*3/UL (ref 0–0.7)
IMM GRANULOCYTES NFR BLD AUTO: 0.1 % (ref 0–0.9)
LDLC SERPL CALC-MCNC: 48 MG/DL
LYMPHOCYTES # BLD AUTO: 2.78 X10*3/UL (ref 1.2–4.8)
LYMPHOCYTES NFR BLD AUTO: 33.1 %
MAGNESIUM SERPL-MCNC: 2.03 MG/DL (ref 1.6–2.4)
MCH RBC QN AUTO: 24.5 PG (ref 26–34)
MCHC RBC AUTO-ENTMCNC: 28.1 G/DL (ref 32–36)
MCV RBC AUTO: 87 FL (ref 80–100)
MICROALBUMIN UR-MCNC: 9.4 MG/L
MICROALBUMIN/CREAT UR: 9.4 UG/MG CREAT
MONOCYTES # BLD AUTO: 0.55 X10*3/UL (ref 0.1–1)
MONOCYTES NFR BLD AUTO: 6.6 %
NEUTROPHILS # BLD AUTO: 4.27 X10*3/UL (ref 1.2–7.7)
NEUTROPHILS NFR BLD AUTO: 50.9 %
NON HDL CHOLESTEROL: 83 MG/DL (ref 0–149)
NRBC BLD-RTO: 0 /100 WBCS (ref 0–0)
PHOSPHATE SERPL-MCNC: 3.9 MG/DL (ref 2.5–4.9)
PLATELET # BLD AUTO: 466 X10*3/UL (ref 150–450)
POTASSIUM SERPL-SCNC: 4.6 MMOL/L (ref 3.5–5.3)
RBC # BLD AUTO: 3.79 X10*6/UL (ref 4–5.2)
SODIUM SERPL-SCNC: 139 MMOL/L (ref 136–145)
TRIGL SERPL-MCNC: 174 MG/DL (ref 0–149)
TSH SERPL-ACNC: 0.91 MIU/L (ref 0.44–3.98)
VLDL: 35 MG/DL (ref 0–40)
WBC # BLD AUTO: 8.4 X10*3/UL (ref 4.4–11.3)

## 2024-05-08 PROCEDURE — 3048F LDL-C <100 MG/DL: CPT | Performed by: STUDENT IN AN ORGANIZED HEALTH CARE EDUCATION/TRAINING PROGRAM

## 2024-05-08 PROCEDURE — 99214 OFFICE O/P EST MOD 30 MIN: CPT | Performed by: STUDENT IN AN ORGANIZED HEALTH CARE EDUCATION/TRAINING PROGRAM

## 2024-05-08 PROCEDURE — 3075F SYST BP GE 130 - 139MM HG: CPT | Performed by: STUDENT IN AN ORGANIZED HEALTH CARE EDUCATION/TRAINING PROGRAM

## 2024-05-08 PROCEDURE — 86140 C-REACTIVE PROTEIN: CPT

## 2024-05-08 PROCEDURE — 3044F HG A1C LEVEL LT 7.0%: CPT | Performed by: STUDENT IN AN ORGANIZED HEALTH CARE EDUCATION/TRAINING PROGRAM

## 2024-05-08 PROCEDURE — 1036F TOBACCO NON-USER: CPT | Performed by: STUDENT IN AN ORGANIZED HEALTH CARE EDUCATION/TRAINING PROGRAM

## 2024-05-08 PROCEDURE — 80061 LIPID PANEL: CPT

## 2024-05-08 PROCEDURE — 83735 ASSAY OF MAGNESIUM: CPT

## 2024-05-08 PROCEDURE — 82043 UR ALBUMIN QUANTITATIVE: CPT

## 2024-05-08 PROCEDURE — 85652 RBC SED RATE AUTOMATED: CPT

## 2024-05-08 PROCEDURE — 84443 ASSAY THYROID STIM HORMONE: CPT

## 2024-05-08 PROCEDURE — 4010F ACE/ARB THERAPY RXD/TAKEN: CPT | Performed by: STUDENT IN AN ORGANIZED HEALTH CARE EDUCATION/TRAINING PROGRAM

## 2024-05-08 PROCEDURE — 85025 COMPLETE CBC W/AUTO DIFF WBC: CPT

## 2024-05-08 PROCEDURE — 3079F DIAST BP 80-89 MM HG: CPT | Performed by: STUDENT IN AN ORGANIZED HEALTH CARE EDUCATION/TRAINING PROGRAM

## 2024-05-08 PROCEDURE — 80069 RENAL FUNCTION PANEL: CPT

## 2024-05-08 PROCEDURE — 83036 HEMOGLOBIN GLYCOSYLATED A1C: CPT

## 2024-05-08 PROCEDURE — 3061F NEG MICROALBUMINURIA REV: CPT | Performed by: STUDENT IN AN ORGANIZED HEALTH CARE EDUCATION/TRAINING PROGRAM

## 2024-05-08 PROCEDURE — 36415 COLL VENOUS BLD VENIPUNCTURE: CPT

## 2024-05-08 PROCEDURE — 82570 ASSAY OF URINE CREATININE: CPT

## 2024-05-08 RX ORDER — POLYETHYLENE GLYCOL-3350 AND ELECTROLYTES WITH FLAVOR PACK 240; 5.84; 2.98; 6.72; 22.72 G/278.26G; G/278.26G; G/278.26G; G/278.26G; G/278.26G
POWDER, FOR SOLUTION ORAL
COMMUNITY
Start: 2024-04-18

## 2024-05-08 ASSESSMENT — ENCOUNTER SYMPTOMS
DIARRHEA: 1
WEAKNESS: 0
HEADACHES: 1
VOMITING: 0
CONSTIPATION: 0
LIGHT-HEADEDNESS: 0
NAUSEA: 0
PALPITATIONS: 0
DIAPHORESIS: 0

## 2024-05-08 ASSESSMENT — PAIN SCALES - GENERAL: PAINLEVEL: 0-NO PAIN

## 2024-05-08 NOTE — PROGRESS NOTES
"Subjective   Patient ID: Mari Dowling is a 64 y.o. female who presents for Follow-up.    HPI   Mari Dowling 65yo F, w/ PMHx. HLD, NIDDM2, CKD 3b A1 who presents for FU.     Review of Systems   Constitutional:  Negative for diaphoresis.   Eyes:  Negative for visual disturbance.   Cardiovascular:  Negative for chest pain and palpitations.   Gastrointestinal:  Positive for diarrhea. Negative for constipation, nausea and vomiting.   Neurological:  Positive for headaches. Negative for syncope, weakness and light-headedness.       Objective   BP (!) 134/91 (BP Location: Right arm, Patient Position: Sitting)   Pulse 87   Temp 36.2 °C (97.2 °F) (Temporal)   Ht 1.499 m (4' 11\")   Wt 59 kg (130 lb)   SpO2 99%   BMI 26.26 kg/m²     Physical Exam  Constitutional:       General: She is not in acute distress.     Appearance: Normal appearance. She is normal weight. She is not ill-appearing, toxic-appearing or diaphoretic.   HENT:      Head: Normocephalic and atraumatic.      Right Ear: External ear normal.      Left Ear: External ear normal.   Cardiovascular:      Rate and Rhythm: Normal rate and regular rhythm.   Abdominal:      General: Abdomen is flat. Bowel sounds are increased. There is no distension.      Palpations: Abdomen is soft.      Tenderness: There is abdominal tenderness in the right upper quadrant, epigastric area and left upper quadrant.   Neurological:      Mental Status: She is alert.       Assessment/Plan     Mari Dowling 65yo F, w/ PMHx. HLD, NIDDM2, CKD 3b A1 who presents for FU.     #R Internal Carotid Stenosis  - Dr. Barillas called hte patient and discussed the results with the patient.   - Reviewed the results again with patient and informed her that her PCP has sent a referral to Vascular Surgery for FU.   - Provided contact information for scheduling     #HTN  - IO BP: 134/91, 153/83,   - Home BP: 136/93,   - Goal BP: pending ascvd to determine if 130/80 vs 140/90.   - Patient denies,, VC, " Palpitations, CP, LOC/Neurologic Deficits  - Current Regiment: hydrochlorothiazide 25 mgs, Lisinopril 40 mgs every day,   -> endorses complaince w/ taking HTN medication  - Provided patient handout on DASH Diet and reviewed with them fundamentals of DASH Diet and foods to avoid (i.e. canned foods, high in Na).   Reviewed w/ patient how to take her BP at home. Provided bp log and informed patient to take BP 1x per day, record values and present at next appointment with log to review. If readings are greater than SBP>140, DBP>90, to call clinic and leave VM to inform me and we will make appropriate changes to our regiment.     #Diarrhea,   - 4 year history of ongoing diarrhea, > 3-4 BM daily,   - states it has been consistent and has been ongoing for   -= Denies Hematochezia or Melena, Denies painful.    - Liquid No solid pieces,   - chronic diarrhea  Denies family with colorectal cancer  -> Labs Ordered:CBC, ESR,  CRP, , Calprotectin stool, Cryptosporidium Antigen stool, Giardia Antigen, H. pylori Antigen stool, stool pathogen panel PCR,   - Colonoscopy already scheduled, Screening (messaged provider, to see if they can change it to a diagnostic colonoscopy)      RTC in 1 months for FU w/ Dr. Barillas or as needed. Dr. Barillas was updated with plan and work up along with recommendations for future care of this patient.     Seen and Discussed w/ Dr. Sagar Linton MD  FM & PM Resident

## 2024-05-08 NOTE — PROGRESS NOTES
Precepting Note  Mari Dowling  [id number]    Patient was seen in [facility] residency clinic today as part of a multidisciplinary teaching team. I participated in this patient's care as a elza precepting physician.    Agreeable with plan as discussed with resident Miriam Linton MD and attending provider, Dr. Keith.      Eleonora Crow MD  Family Medicine, PGY-3

## 2024-05-11 ENCOUNTER — LAB (OUTPATIENT)
Dept: LAB | Facility: LAB | Age: 64
End: 2024-05-11
Payer: COMMERCIAL

## 2024-05-11 DIAGNOSIS — R19.7 DIARRHEA, UNSPECIFIED TYPE: ICD-10-CM

## 2024-05-11 PROCEDURE — 87449 NOS EACH ORGANISM AG IA: CPT

## 2024-05-11 PROCEDURE — 87329 GIARDIA AG IA: CPT

## 2024-05-11 PROCEDURE — 83993 ASSAY FOR CALPROTECTIN FECAL: CPT

## 2024-05-11 PROCEDURE — 87328 CRYPTOSPORIDIUM AG IA: CPT

## 2024-05-13 NOTE — PROGRESS NOTES
I saw and evaluated the patient. I personally obtained the key and critical portions of the history and physical exam or was physically present for key and critical portions performed by the resident/fellow. I reviewed the resident/fellow's documentation and discussed the patient with the resident/fellow. I agree with the resident/fellow's medical decision making as documented in the note.    Mer Keith MD

## 2024-05-14 LAB
CALPROTECTIN STL-MCNT: 45 UG/G
CRYPTOSP AG STL QL IA: NEGATIVE
G LAMBLIA AG STL QL IA: NEGATIVE
H PYLORI AG STL QL IA: NEGATIVE

## 2024-05-15 ENCOUNTER — OFFICE VISIT (OUTPATIENT)
Dept: VASCULAR SURGERY | Facility: HOSPITAL | Age: 64
End: 2024-05-15
Payer: COMMERCIAL

## 2024-05-15 VITALS
HEART RATE: 94 BPM | OXYGEN SATURATION: 99 % | HEIGHT: 59 IN | BODY MASS INDEX: 26.08 KG/M2 | SYSTOLIC BLOOD PRESSURE: 145 MMHG | DIASTOLIC BLOOD PRESSURE: 85 MMHG | WEIGHT: 129.4 LBS

## 2024-05-15 DIAGNOSIS — I65.22 INTERNAL CAROTID ARTERY STENOSIS, LEFT: ICD-10-CM

## 2024-05-15 PROCEDURE — 3079F DIAST BP 80-89 MM HG: CPT | Performed by: NURSE PRACTITIONER

## 2024-05-15 PROCEDURE — 99214 OFFICE O/P EST MOD 30 MIN: CPT | Performed by: NURSE PRACTITIONER

## 2024-05-15 PROCEDURE — 99204 OFFICE O/P NEW MOD 45 MIN: CPT | Performed by: NURSE PRACTITIONER

## 2024-05-15 PROCEDURE — 3048F LDL-C <100 MG/DL: CPT | Performed by: NURSE PRACTITIONER

## 2024-05-15 PROCEDURE — 3061F NEG MICROALBUMINURIA REV: CPT | Performed by: NURSE PRACTITIONER

## 2024-05-15 PROCEDURE — 1036F TOBACCO NON-USER: CPT | Performed by: NURSE PRACTITIONER

## 2024-05-15 PROCEDURE — 3077F SYST BP >= 140 MM HG: CPT | Performed by: NURSE PRACTITIONER

## 2024-05-15 PROCEDURE — 4010F ACE/ARB THERAPY RXD/TAKEN: CPT | Performed by: NURSE PRACTITIONER

## 2024-05-15 PROCEDURE — 3044F HG A1C LEVEL LT 7.0%: CPT | Performed by: NURSE PRACTITIONER

## 2024-05-15 RX ORDER — ASPIRIN 81 MG/1
81 TABLET ORAL DAILY
Qty: 30 TABLET | Refills: 11 | Status: SHIPPED | OUTPATIENT
Start: 2024-05-15 | End: 2024-06-06 | Stop reason: SDUPTHER

## 2024-05-15 ASSESSMENT — PAIN SCALES - GENERAL: PAINLEVEL: 0-NO PAIN

## 2024-05-15 NOTE — LETTER
May 15, 2024     Patient: Mari Dowling   YOB: 1960   Date of Visit: 5/15/2024       To Whom It May Concern:    Mari Dowling was seen in my clinic on 5/15/2024 at 9:20 am. Please excuse Mari for her absence from work on this day to make the appointment. She may return to work on 5/16/2024.     If you have any questions or concerns, please don't hesitate to call.         Sincerely,         MONIQUE Junior-CNP        CC: No Recipients

## 2024-05-16 NOTE — PROGRESS NOTES
Vascular Surgery Clinic Note    CC: NPV carotid stenosis     History Of Present Illness:   Mari Dowling is a 64 y.o. RHD female here for initial evaluation of carotid artery stenosis that was detected on duplex imaging after carotid bruit was noted by PCP. She denies amaurosis fugax or TIA symptoms.  She has no history of neck radiation.  She does not smoke.  She is currently on a statin.    Carotid duplex demonstrates less than 50% right ICA stenosis and >70% left ICA stenosis with a PSV of 461 cm/s and a ratio of 7.5.     She denies claudication symptoms and was able to walk a mile distance.  She denies any known family history of aneurysmal disease.  Her weight has been stable.  She denies chest pain and is able to climb 2 flights of stairs.  She does report occasional dyspnea on exertion. She has no history of CVA, CAD, COPD, malginancy or thyroid disease.     PMH: Hypertension, hyperlipidemia, type 2 diabetes, CKD 3a  PSH: none  SH: non-smoker, lives with sister, works at a     Medical History:  Patient Active Problem List   Diagnosis    Acute kidney injury (CMS-HCC)    Allergic rhinitis    Anemia    Anxiety    Arthralgia    CKD (chronic kidney disease) stage 3, GFR 30-59 ml/min (Multi)    Constipation    Cough    Depression    Diabetic neuropathy (Multi)    Diverticulosis    Dry eyes, bilateral    DVD (dissociated vertical deviation)    Esotropia of right eye    GERD (gastroesophageal reflux disease)    Helicobacter pylori gastritis    Hematemesis    Hiatal hernia    Hyperlipidemia    Hypertension    Combined form of age-related cataract, left eye    Combined form of age-related cataract, right eye    PCO (posterior capsular opacification), left    PCO (posterior capsular opacification), right    Peptic ulcer disease    Primary open angle glaucoma (POAG) of both eyes, indeterminate stage    Pseudophakia of both eyes    Simple renal cyst    Body aches    Hyperopia with presbyopia of both eyes     "Overweight with body mass index (BMI) of 27 to 27.9 in adult    Low back pain    Hypertensive CKD (chronic kidney disease)        SH:    Social Determinants of Health     Tobacco Use: Low Risk  (5/15/2024)    Patient History     Smoking Tobacco Use: Never     Smokeless Tobacco Use: Never     Passive Exposure: Not on file   Alcohol Use: Not on file   Financial Resource Strain: Not on file   Food Insecurity: Not on file   Transportation Needs: Not on file   Physical Activity: Not on file   Stress: Not on file   Social Connections: Not on file   Intimate Partner Violence: Not on file   Depression: Not on file   Housing Stability: Not on file   Utilities: Not on file   Digital Equity: Not on file   Health Literacy: Not on file        FH:  Family History   Problem Relation Name Age of Onset    Hypertension Mother      Lung cancer Father      Other (Diabetes Mellitus) Sister      Colon cancer Father's Sister      Colon cancer Paternal Grandmother      Breast cancer Other Aunt         Allergies:   No Known Allergies    ROS:  All systems were reviewed and noted to be negative, other than described above.     Objective:  Last Recorded Vitals  Blood pressure 145/85, pulse 94, height 1.499 m (4' 11\"), weight 58.7 kg (129 lb 6.4 oz), SpO2 99%.    Meds:   Current Outpatient Medications   Medication Instructions    8 Hour Pain Reliever 650 mg ER tablet TAKE 1 TABLET(650 MG) BY MOUTH EVERY MORNING, 1 TABLET AT NOON AND 1 TABLET AT BEDTIME. DO NOT CRUSH, CHEW, OR SPLIT    albuterol 90 mcg/actuation inhaler 2 puffs, inhalation, Every 4 hours PRN    aspirin 81 mg, oral, Daily    atorvastatin (LIPITOR) 40 mg, oral, Nightly    docusate sodium (COLACE) 100 mg, oral, 2 times daily PRN    FREESTYLE LANCETS MISC miscellaneous, Daily    gabapentin (NEURONTIN) 200 mg, oral, Nightly    GaviLyte-C 240-22.72-6.72 -5.84 gram solution     hydroCHLOROthiazide (HYDRODIURIL) 12.5 mg, oral, Daily    hydrocortisone 2.5 % ointment 1 Application    " "lidocaine (Xylocaine) 5 % ointment Topical, As needed, Apple To Effected Area As Directed    lisinopril 40 mg, oral, Daily    OneTouch Ultra Test strip USE AS DIRECTED.    pantoprazole (PROTONIX) 40 mg, oral, Daily before breakfast    pen needle 1/2\" 29G X 12mm needle Use as instructed    Trulicity 3 mg, subcutaneous, Every 7 days       Exam:  Constitutional: Well appearing, NAD   PSYCH: Appropriate mood and affect  Eyes: Sclera clear   Neck: Supple, left carotid bruit noted.   CV: No tachycardia   RESP: Unlabored breathing   GI: Soft, nontender, non-distended. No abdominal aortic aneurysm noted.   SKIN: No lesions  NEURO: No focal deficits noted. Motor/sensory intact.   EXTREMITIES: Warm & well perfused. No leg edema. No evidence of arterial ischemia. No evidence of venous insufficiency.   PULSES: Normal pulse exam throughout     Imaging Reviewed:  Vascular US carotid artery duplex bilateral 04/25/2024    [place] [address]  Tel [phone] and Fax [phone]      Vascular Lab Report  [facility] US CAROTID ARTERY DUPLEX BILATERAL      Patient Name:      KAMINI WOODARD       Reading Physician:  [id number] Leon Carroll DO  Study Date:        4/25/2024           Ordering Physician: [id number] DEON AGUILA  MRN/PID:           [id number]            Technologist:       Karen LOPES  Accession#:        [id number]        Technologist 2:  Date of Birth/Age: 1960 / 64      Encounter#:         [id number]  years  Gender:            F  Admission Status:  Outpatient          Location Performed: [facility]      Diagnosis/ICD: Other specified symptoms and signs involving the circulatory and  respiratory systems-R09.89  Indication:    Bruit  CPT Codes:     65584 Cerebrovascular Carotid Duplex scan complete      Patient History HTN. Headaches.      **CRITICAL RESULT**  Critical Result: > 70% stenosis in left ICA  Notification called to Lori Barillas MD , Deon " Tessa JUÁREZ on 4/25/2024 at 11:12:42 AM by Karen LOPES.    CONCLUSIONS:  Right Carotid: Findings are consistent with less than 50% stenosis of the right proximal internal carotid artery. Laminar flow seen by color Doppler. Right external carotid artery appears patent with no evidence of stenosis. The right vertebral artery is patent with antegrade flow. No evidence of hemodynamically significant stenosis in the right subclavian artery.  Left Carotid: Findings are consistent with greater than 70% stenosis of the left proximal internal carotid artery. Left external carotid artery appears patent with no evidence of stenosis. No evidence of hemodynamically significant stenosis of the left common carotid artery. The left vertebral artery is patent with antegrade flow. No evidence of hemodynamically significant stenosis in the left subclavian artery.    Additional Findings:  Bilaterally ICA are tortuous.      Imaging & Doppler Findings:    Right                        Left  PSV      EDV                PSV      EDV  103 cm/s           CCA P    81 cm/s  72 cm/s            CCA D    61 cm/s  85 cm/s  25 cm/s   ICA P    461 cm/s  100 cm/s 37 cm/s   ICA M    56 cm/s  26 cm/s  85 cm/s  32 cm/s   ICA D    52 cm/s  31 cm/s  126 cm/s            ECA     66 cm/s  52 cm/s  20 cm/s Vertebral  59 cm/s  197 cm/s         Subclavian 95 cm/s    Right Left  ICA/CCA Ratio  1.2  7.5        [id number] Leon Carroll DO  Electronically signed by [id number] Leon Carroll DO on 4/25/2024 at 4:13:15 PM        ** Final **      Assessment & Plan:  1. Internal carotid artery stenosis, left  Referral to Vascular Surgery    CT angio neck    aspirin 81 mg EC tablet        Asymptomatic carotid artery stenosis.   Blood pressure control  Continue statin, start aspirin  Obtain CTA neck with oral hydration protocol   Virtual visit after imaging     Orders:  Orders Placed This Encounter   Procedures    CT angio neck     Jaimie Washington, APRN-CNP

## 2024-05-17 ENCOUNTER — APPOINTMENT (OUTPATIENT)
Dept: RADIOLOGY | Facility: HOSPITAL | Age: 64
End: 2024-05-17
Payer: COMMERCIAL

## 2024-05-20 ENCOUNTER — HOSPITAL ENCOUNTER (OUTPATIENT)
Dept: RADIOLOGY | Facility: HOSPITAL | Age: 64
Discharge: HOME | End: 2024-05-20
Payer: COMMERCIAL

## 2024-05-20 ENCOUNTER — HOSPITAL ENCOUNTER (EMERGENCY)
Facility: HOSPITAL | Age: 64
Discharge: HOME | End: 2024-05-20
Payer: COMMERCIAL

## 2024-05-20 ENCOUNTER — CLINICAL SUPPORT (OUTPATIENT)
Dept: EMERGENCY MEDICINE | Facility: HOSPITAL | Age: 64
End: 2024-05-20
Payer: COMMERCIAL

## 2024-05-20 VITALS
TEMPERATURE: 97.7 F | RESPIRATION RATE: 16 BRPM | SYSTOLIC BLOOD PRESSURE: 144 MMHG | DIASTOLIC BLOOD PRESSURE: 85 MMHG | HEART RATE: 73 BPM | WEIGHT: 129 LBS | HEIGHT: 59 IN | OXYGEN SATURATION: 100 % | BODY MASS INDEX: 26 KG/M2

## 2024-05-20 DIAGNOSIS — I65.22 INTERNAL CAROTID ARTERY STENOSIS, LEFT: ICD-10-CM

## 2024-05-20 LAB
ATRIAL RATE: 72 BPM
P AXIS: 57 DEGREES
P OFFSET: 216 MS
P ONSET: 173 MS
PR INTERVAL: 112 MS
Q ONSET: 229 MS
QRS COUNT: 11 BEATS
QRS DURATION: 66 MS
QT INTERVAL: 382 MS
QTC CALCULATION(BAZETT): 418 MS
QTC FREDERICIA: 406 MS
R AXIS: 46 DEGREES
T AXIS: 6 DEGREES
T OFFSET: 420 MS
VENTRICULAR RATE: 72 BPM

## 2024-05-20 PROCEDURE — 70498 CT ANGIOGRAPHY NECK: CPT | Performed by: RADIOLOGY

## 2024-05-20 PROCEDURE — 70498 CT ANGIOGRAPHY NECK: CPT

## 2024-05-20 PROCEDURE — 99281 EMR DPT VST MAYX REQ PHY/QHP: CPT

## 2024-05-20 PROCEDURE — 2550000001 HC RX 255 CONTRASTS: Performed by: NURSE PRACTITIONER

## 2024-05-20 PROCEDURE — 93005 ELECTROCARDIOGRAM TRACING: CPT

## 2024-05-20 RX ADMIN — IOHEXOL 80 ML: 350 INJECTION, SOLUTION INTRAVENOUS at 13:25

## 2024-05-20 ASSESSMENT — COLUMBIA-SUICIDE SEVERITY RATING SCALE - C-SSRS
6. HAVE YOU EVER DONE ANYTHING, STARTED TO DO ANYTHING, OR PREPARED TO DO ANYTHING TO END YOUR LIFE?: NO
1. IN THE PAST MONTH, HAVE YOU WISHED YOU WERE DEAD OR WISHED YOU COULD GO TO SLEEP AND NOT WAKE UP?: NO
2. HAVE YOU ACTUALLY HAD ANY THOUGHTS OF KILLING YOURSELF?: NO

## 2024-05-20 ASSESSMENT — PAIN - FUNCTIONAL ASSESSMENT: PAIN_FUNCTIONAL_ASSESSMENT: 0-10

## 2024-05-20 ASSESSMENT — PAIN SCALES - GENERAL: PAINLEVEL_OUTOF10: 10 - WORST POSSIBLE PAIN

## 2024-05-20 NOTE — ED TRIAGE NOTES
Patient had outpatient CT scan done for a possible clogged artery and after the scan was done the patient started having CP. Patient is having chest pain on the left side with associated intermittent dizziness    Complex Repair And Rotation Flap Text: The defect edges were debeveled with a #15 scalpel blade.  The primary defect was closed partially with a complex linear closure.  Given the location of the remaining defect, shape of the defect and the proximity to free margins a rotation flap was deemed most appropriate for complete closure of the defect.  Using a sterile surgical marker, an appropriate advancement flap was drawn incorporating the defect and placing the expected incisions within the relaxed skin tension lines where possible.    The area thus outlined was incised deep to adipose tissue with a #15 scalpel blade.  The skin margins were undermined to an appropriate distance in all directions utilizing iris scissors.

## 2024-05-22 DIAGNOSIS — R06.02 SOB (SHORTNESS OF BREATH): Primary | ICD-10-CM

## 2024-05-22 RX ORDER — ALBUTEROL SULFATE 90 UG/1
2 AEROSOL, METERED RESPIRATORY (INHALATION) EVERY 4 HOURS PRN
Qty: 18 G | Refills: 3 | Status: SHIPPED | OUTPATIENT
Start: 2024-05-22 | End: 2024-06-06 | Stop reason: SDUPTHER

## 2024-05-29 ENCOUNTER — TELEPHONE (OUTPATIENT)
Dept: VASCULAR MEDICINE | Facility: HOSPITAL | Age: 64
End: 2024-05-29

## 2024-06-03 PROBLEM — K52.9 INFLAMMATION OF STOMACH AND INTESTINE: Status: ACTIVE | Noted: 2024-06-03

## 2024-06-03 PROBLEM — J06.9 VIRAL UPPER RESPIRATORY TRACT INFECTION: Status: ACTIVE | Noted: 2024-06-03

## 2024-06-03 PROBLEM — S37.009A INJURY OF KIDNEY: Status: ACTIVE | Noted: 2024-06-03

## 2024-06-03 PROBLEM — R58 BLEEDING: Status: ACTIVE | Noted: 2024-06-03

## 2024-06-04 ENCOUNTER — APPOINTMENT (OUTPATIENT)
Dept: GASTROENTEROLOGY | Facility: HOSPITAL | Age: 64
End: 2024-06-04
Payer: COMMERCIAL

## 2024-06-05 ENCOUNTER — OFFICE VISIT (OUTPATIENT)
Dept: VASCULAR SURGERY | Facility: HOSPITAL | Age: 64
End: 2024-06-05
Payer: COMMERCIAL

## 2024-06-05 VITALS
HEIGHT: 59 IN | HEART RATE: 78 BPM | WEIGHT: 136.2 LBS | BODY MASS INDEX: 27.46 KG/M2 | SYSTOLIC BLOOD PRESSURE: 161 MMHG | DIASTOLIC BLOOD PRESSURE: 84 MMHG | OXYGEN SATURATION: 100 %

## 2024-06-05 DIAGNOSIS — I65.22 INTERNAL CAROTID ARTERY STENOSIS, LEFT: Primary | ICD-10-CM

## 2024-06-05 PROCEDURE — 99214 OFFICE O/P EST MOD 30 MIN: CPT | Performed by: NURSE PRACTITIONER

## 2024-06-05 PROCEDURE — 3079F DIAST BP 80-89 MM HG: CPT | Performed by: NURSE PRACTITIONER

## 2024-06-05 PROCEDURE — 3077F SYST BP >= 140 MM HG: CPT | Performed by: NURSE PRACTITIONER

## 2024-06-05 PROCEDURE — 3048F LDL-C <100 MG/DL: CPT | Performed by: NURSE PRACTITIONER

## 2024-06-05 PROCEDURE — 3044F HG A1C LEVEL LT 7.0%: CPT | Performed by: NURSE PRACTITIONER

## 2024-06-05 PROCEDURE — 3061F NEG MICROALBUMINURIA REV: CPT | Performed by: NURSE PRACTITIONER

## 2024-06-05 PROCEDURE — 4010F ACE/ARB THERAPY RXD/TAKEN: CPT | Performed by: NURSE PRACTITIONER

## 2024-06-05 ASSESSMENT — PAIN SCALES - GENERAL: PAINLEVEL: 0-NO PAIN

## 2024-06-06 ENCOUNTER — PHARMACY VISIT (OUTPATIENT)
Dept: PHARMACY | Facility: CLINIC | Age: 64
End: 2024-06-06
Payer: COMMERCIAL

## 2024-06-06 ENCOUNTER — OFFICE VISIT (OUTPATIENT)
Dept: PRIMARY CARE | Facility: CLINIC | Age: 64
End: 2024-06-06
Payer: COMMERCIAL

## 2024-06-06 VITALS
RESPIRATION RATE: 18 BRPM | TEMPERATURE: 98.1 F | SYSTOLIC BLOOD PRESSURE: 149 MMHG | HEART RATE: 78 BPM | DIASTOLIC BLOOD PRESSURE: 87 MMHG | WEIGHT: 135.4 LBS | HEIGHT: 59 IN | OXYGEN SATURATION: 95 % | BODY MASS INDEX: 27.3 KG/M2

## 2024-06-06 DIAGNOSIS — R06.02 SOB (SHORTNESS OF BREATH): ICD-10-CM

## 2024-06-06 DIAGNOSIS — R07.9 CHEST PAIN, UNSPECIFIED TYPE: Primary | ICD-10-CM

## 2024-06-06 DIAGNOSIS — E11.65 UNCONTROLLED TYPE 2 DIABETES MELLITUS WITH HYPERGLYCEMIA (MULTI): ICD-10-CM

## 2024-06-06 DIAGNOSIS — I65.22 INTERNAL CAROTID ARTERY STENOSIS, LEFT: ICD-10-CM

## 2024-06-06 DIAGNOSIS — J45.909 ASTHMA, UNSPECIFIED ASTHMA SEVERITY, UNSPECIFIED WHETHER COMPLICATED, UNSPECIFIED WHETHER PERSISTENT (HHS-HCC): ICD-10-CM

## 2024-06-06 DIAGNOSIS — R19.7 DIARRHEA, UNSPECIFIED TYPE: ICD-10-CM

## 2024-06-06 DIAGNOSIS — I10 HYPERTENSION, UNSPECIFIED TYPE: ICD-10-CM

## 2024-06-06 PROCEDURE — RXMED WILLOW AMBULATORY MEDICATION CHARGE

## 2024-06-06 RX ORDER — ALBUTEROL SULFATE 90 UG/1
2 AEROSOL, METERED RESPIRATORY (INHALATION) EVERY 4 HOURS PRN
Qty: 18 G | Refills: 3 | Status: SHIPPED | OUTPATIENT
Start: 2024-06-06

## 2024-06-06 RX ORDER — FLUTICASONE PROPIONATE AND SALMETEROL 100; 50 UG/1; UG/1
1 POWDER RESPIRATORY (INHALATION)
Qty: 60 EACH | Refills: 11 | Status: SHIPPED | OUTPATIENT
Start: 2024-06-06 | End: 2024-06-06 | Stop reason: SDUPTHER

## 2024-06-06 RX ORDER — DULAGLUTIDE 1.5 MG/.5ML
1.5 INJECTION, SOLUTION SUBCUTANEOUS
Qty: 2 ML | Refills: 11 | Status: SHIPPED | OUTPATIENT
Start: 2024-06-09 | End: 2024-06-06 | Stop reason: SDUPTHER

## 2024-06-06 RX ORDER — ASPIRIN 81 MG/1
81 TABLET ORAL DAILY
Qty: 90 TABLET | Refills: 3 | Status: SHIPPED | OUTPATIENT
Start: 2024-06-06 | End: 2025-06-06

## 2024-06-06 RX ORDER — DULAGLUTIDE 1.5 MG/.5ML
1.5 INJECTION, SOLUTION SUBCUTANEOUS
Qty: 2 ML | Refills: 11 | Status: SHIPPED | OUTPATIENT
Start: 2024-06-09

## 2024-06-06 RX ORDER — FLUTICASONE PROPIONATE AND SALMETEROL 100; 50 UG/1; UG/1
1 POWDER RESPIRATORY (INHALATION)
Qty: 60 EACH | Refills: 11 | Status: SHIPPED | OUTPATIENT
Start: 2024-06-06 | End: 2025-06-06

## 2024-06-06 RX ORDER — DULAGLUTIDE 3 MG/.5ML
3 INJECTION, SOLUTION SUBCUTANEOUS
Qty: 0.5 ML | Refills: 2 | Status: SHIPPED | OUTPATIENT
Start: 2024-06-06 | End: 2024-06-06 | Stop reason: WASHOUT

## 2024-06-06 RX ORDER — ASPIRIN 81 MG/1
81 TABLET ORAL DAILY
Qty: 90 TABLET | Refills: 3 | Status: SHIPPED | OUTPATIENT
Start: 2024-06-06 | End: 2024-06-06 | Stop reason: SDUPTHER

## 2024-06-06 ASSESSMENT — PAIN SCALES - GENERAL: PAINLEVEL: 10-WORST PAIN EVER

## 2024-06-06 ASSESSMENT — ENCOUNTER SYMPTOMS
DEPRESSION: 0
OCCASIONAL FEELINGS OF UNSTEADINESS: 0
LOSS OF SENSATION IN FEET: 0

## 2024-06-06 ASSESSMENT — PATIENT HEALTH QUESTIONNAIRE - PHQ9
2. FEELING DOWN, DEPRESSED OR HOPELESS: NOT AT ALL
SUM OF ALL RESPONSES TO PHQ9 QUESTIONS 1 AND 2: 0
1. LITTLE INTEREST OR PLEASURE IN DOING THINGS: NOT AT ALL

## 2024-06-06 NOTE — PROGRESS NOTES
Agree with Resident and/or Medical student plan for encouragement of gluten free diet with loperamide for management of chronic diarrhea with referral for to GI. Plan for in office EKG and echo referral for chest pain of unclear cause.     Patient discussed with attending, Dr. Tessa Salgado MD  Family Medicine, PGY-3

## 2024-06-06 NOTE — PROGRESS NOTES
Subjective   Patient ID: Mari Dowling is a 64 y.o. female who presents for Follow-up.    HPI  #Chronic diarrhea  - persistent, watery   - no fevers  - has not changed since last visit, did not get stool studies done  - denies dark or blood stools  - unable to identify triggers  - weight stable  -  Labs Ordered: CBC, ESR,  CRP, Calprotectin stool, Cryptosporidium Antigen stool, Giardia Antigen, H. pylori Antigen stool, stool pathogen panel PCR negative 5/11/24    #Asthma  - no smoking hx  - on albuterol PRN  - no recent worsening of symptoms   Patient Active Problem List   Diagnosis    Acute kidney injury (CMS-[facility])    Allergic rhinitis    Anemia    Anxiety    Arthralgia    CKD (chronic kidney disease) stage 3, GFR 30-59 ml/min (Multi)    Constipation    Cough    Depression    Diabetic neuropathy (Multi)    Diverticulosis    Dry eyes, bilateral    DVD (dissociated vertical deviation)    Esotropia of right eye    GERD (gastroesophageal reflux disease)    Helicobacter pylori gastritis    Hematemesis    Hiatal hernia    Hyperlipidemia    Hypertension    Combined form of age-related cataract, left eye    Combined form of age-related cataract, right eye    PCO (posterior capsular opacification), left    PCO (posterior capsular opacification), right    Peptic ulcer disease    Primary open angle glaucoma (POAG) of both eyes, indeterminate stage    Pseudophakia of both eyes    Simple renal cyst    Body aches    Hyperopia with presbyopia of both eyes    Overweight with body mass index (BMI) of 27 to 27.9 in adult    Low back pain    Hypertensive CKD (chronic kidney disease)    Bleeding    Inflammation of stomach and intestine    Injury of kidney    Irritant contact dermatitis, unspecified cause    Nuclear sclerosis    Viral upper respiratory tract infection      Current Outpatient Medications   Medication Instructions    8 Hour Pain Reliever 650 mg ER tablet TAKE 1 TABLET(650 MG) BY MOUTH EVERY MORNING, 1 TABLET AT NOON AND 1  "TABLET AT BEDTIME. DO NOT CRUSH, CHEW, OR SPLIT    albuterol 90 mcg/actuation inhaler 2 puffs, inhalation, Every 4 hours PRN    aspirin 81 mg, oral, Daily    atorvastatin (LIPITOR) 40 mg, oral, Nightly    docusate sodium (COLACE) 100 mg, oral, 2 times daily PRN    fluticasone propion-salmeteroL (Advair Diskus) 100-50 mcg/dose diskus inhaler 1 puff, inhalation, 2 times daily RT, Rinse mouth with water after use to reduce aftertaste and incidence of candidiasis. Do not swallow.    FREESTYLE LANCETS MISC miscellaneous, Daily    gabapentin (NEURONTIN) 200 mg, oral, Nightly    GaviLyte-C 240-22.72-6.72 -5.84 gram solution     hydroCHLOROthiazide (HYDRODIURIL) 12.5 mg, oral, Daily    hydrocortisone 2.5 % ointment 1 Application    lidocaine (Xylocaine) 5 % ointment Topical, As needed, Apple To Effected Area As Directed    lisinopril 40 mg, oral, Daily    OneTouch Ultra Test strip USE AS DIRECTED.    pantoprazole (PROTONIX) 40 mg, oral, Daily before breakfast    pen needle 1/2\" 29G X 12mm needle Use as instructed    Trulicity 1.5 mg, subcutaneous, Once Weekly      Past Surgical History:   Procedure Laterality Date    CATARACT EXTRACTION  2017    Cataract Extraction    HYSTERECTOMY  2018    Hysterectomy    OTHER SURGICAL HISTORY  2021     section      No Known Allergies   Social History     Tobacco Use    Smoking status: Never    Smokeless tobacco: Never   Substance Use Topics    Alcohol use: Never    Drug use: Never      Family History   Problem Relation Name Age of Onset    Hypertension Mother      Lung cancer Father      Other (Diabetes Mellitus) Sister      Colon cancer Father's Sister      Colon cancer Paternal Grandmother      Breast cancer Other Aunt         Review of Systems  Ten point review of systems negative unless specified in HPI.     Objective   Vitals: /87 (BP Location: Right arm, Patient Position: Sitting, BP Cuff Size: Adult)   Pulse 78   Temp 36.7 °C (98.1 °F) (Temporal)  " " Resp 18   Ht 1.499 m (4' 11\")   Wt 61.4 kg (135 lb 6.4 oz)   SpO2 95%   BMI 27.35 kg/m²      Physical Exam  General:  Well developed. Alert. No acute distress.  Skin:  Warm, dry. normal skin turgor. no rashes. no lesions.   Head: NCAT  EENT: MMM  Cardiovascular:  Regular rate and rhythm, normal S1 and S2, no gallops, no murmurs and no pericardial rub.  Pulmonary:  CTAB in all fields  Abdomen:  (+) BS. soft. non-tender. non-distended. no abdominal masses. no guarding or rigidity.  Neurologic:  grossly intact  Musculoskeletal: moves all extremities spontaneously  Psychiatric:  appropriate mood and affect    Assessment/Plan   63 yo F presenting for follow-up. She does endorse current chest pain, however vitals stable and I/o ecg wnl. Did also perform bedside echo which did not reveal any gross abnormalities, however, recommended she follow-up with the previously ordered echo.     Chest pain, unspecified type  CAD  -     ECG 12 lead (Clinic Performed)  -     Transthoracic Echo (TTE) Complete; Future  - Aspirin refilled today    T2DM  -     trulicity refilled    Diarrhea, unspecified type  -     Celiac Panel; Future    Asthma, unspecified asthma severity, unspecified whether complicated, unspecified whether persistent ([facility])  -  has used albuterol PRN, never on controller  -  will trial advair  -  continue albuterol PRN      RTC in 3-6 mos, or earlier as needed.  Attending Supervision: seen and discussed with attending physician Dr. Low Zarate.    Lori Barillas MD  PGY-2, Family Medicine.      "

## 2024-06-16 DIAGNOSIS — K21.9 GASTROESOPHAGEAL REFLUX DISEASE, UNSPECIFIED WHETHER ESOPHAGITIS PRESENT: ICD-10-CM

## 2024-06-17 RX ORDER — PANTOPRAZOLE SODIUM 40 MG/1
40 TABLET, DELAYED RELEASE ORAL
Qty: 90 TABLET | Refills: 3 | Status: SHIPPED | OUTPATIENT
Start: 2024-06-17

## 2024-06-21 NOTE — PROGRESS NOTES
Vascular Surgery Clinic Note    CC: FUV carotid stenosis     History Of Present Illness:   Mari Dowling is a 64 y.o. female here for routine follow up. She was recently found to have carotid artery stenosis on duplex imaging performed for a carotid bruit. She denies amaurosis fugax or TIA symptoms. She does not smoke. She has DM. She remains on aspirin and a statin.     CTA neck shows approximately 55% left ICA stenosis (measuring left ICA diameter past post-stenotic dilation).     PMH: Hypertension, hyperlipidemia, type 2 diabetes, CKD 3a  PSH: none  SH: non-smoker, lives with sister, works at a     Medical History:  Patient Active Problem List   Diagnosis    Acute kidney injury (CMS-HCC)    Allergic rhinitis    Anemia    Anxiety    Arthralgia    CKD (chronic kidney disease) stage 3, GFR 30-59 ml/min (Multi)    Constipation    Cough    Depression    Diabetic neuropathy (Multi)    Diverticulosis    Dry eyes, bilateral    DVD (dissociated vertical deviation)    Esotropia of right eye    GERD (gastroesophageal reflux disease)    Helicobacter pylori gastritis    Hematemesis    Hiatal hernia    Hyperlipidemia    Hypertension    Combined form of age-related cataract, left eye    Combined form of age-related cataract, right eye    PCO (posterior capsular opacification), left    PCO (posterior capsular opacification), right    Peptic ulcer disease    Primary open angle glaucoma (POAG) of both eyes, indeterminate stage    Pseudophakia of both eyes    Simple renal cyst    Body aches    Hyperopia with presbyopia of both eyes    Overweight with body mass index (BMI) of 27 to 27.9 in adult    Low back pain    Hypertensive CKD (chronic kidney disease)    Bleeding    Inflammation of stomach and intestine    Injury of kidney    Irritant contact dermatitis, unspecified cause    Nuclear sclerosis    Viral upper respiratory tract infection        SH:    Social Determinants of Health     Tobacco Use: Low Risk  (6/6/2024)     "Patient History     Smoking Tobacco Use: Never     Smokeless Tobacco Use: Never     Passive Exposure: Not on file   Alcohol Use: Not on file   Financial Resource Strain: Not on file   Food Insecurity: Not on file   Transportation Needs: Not on file   Physical Activity: Not on file   Stress: Not on file   Social Connections: Not on file   Intimate Partner Violence: Not on file   Depression: Not at risk (6/6/2024)    PHQ-2     PHQ-2 Score: 0   Housing Stability: Not on file   Utilities: Not on file   Digital Equity: Not on file   Health Literacy: Not on file        FH:  Family History   Problem Relation Name Age of Onset    Hypertension Mother      Lung cancer Father      Other (Diabetes Mellitus) Sister      Colon cancer Father's Sister      Colon cancer Paternal Grandmother      Breast cancer Other Aunt         Allergies:   No Known Allergies    ROS:  All systems were reviewed and noted to be negative, other than described above.     Objective:  Last Recorded Vitals  Blood pressure 161/84, pulse 78, height 1.499 m (4' 11\"), weight 61.8 kg (136 lb 3.2 oz), SpO2 100%.    Meds:   Current Outpatient Medications   Medication Instructions    8 Hour Pain Reliever 650 mg ER tablet TAKE 1 TABLET(650 MG) BY MOUTH EVERY MORNING, 1 TABLET AT NOON AND 1 TABLET AT BEDTIME. DO NOT CRUSH, CHEW, OR SPLIT    albuterol 90 mcg/actuation inhaler 2 puffs, inhalation, Every 4 hours PRN    aspirin 81 mg, oral, Daily    atorvastatin (LIPITOR) 40 mg, oral, Nightly    docusate sodium (COLACE) 100 mg, oral, 2 times daily PRN    fluticasone propion-salmeteroL (Advair Diskus) 100-50 mcg/dose diskus inhaler 1 puff, inhalation, 2 times daily RT, Rinse mouth with water after use to reduce aftertaste and incidence of candidiasis. Do not swallow.    FREESTYLE LANCETS MISC miscellaneous, Daily    gabapentin (NEURONTIN) 200 mg, oral, Nightly    GaviLyte-C 240-22.72-6.72 -5.84 gram solution     hydroCHLOROthiazide (HYDRODIURIL) 12.5 mg, oral, Daily    " "hydrocortisone 2.5 % ointment 1 Application    lidocaine (Xylocaine) 5 % ointment Topical, As needed, Apple To Effected Area As Directed    lisinopril 40 mg, oral, Daily    OneTouch Ultra Test strip USE AS DIRECTED.    pantoprazole (PROTONIX) 40 mg, oral, Daily before breakfast    pen needle 1/2\" 29G X 12mm needle Use as instructed    Trulicity 1.5 mg, subcutaneous, Once Weekly       Exam:  Constitutional: Well appearing, NAD   PSYCH: Appropriate mood and affect  Eyes: Sclera clear  Neck: Supple   CV: No tachycardia   RESP: Unlabored breathing   GI: Soft, nontender, non-distended.   SKIN: No lesions  NEURO: No focal deficits noted.   EXTREMITIES: Warm & well perfused. No leg edema.     Imaging Reviewed:  CT angio neck 05/20/2024    Narrative  Interpreted By:  Daljit Davis and Ebai Jerky  STUDY:  CT ANGIO NECK;  5/20/2024 1:24 pm    INDICATION:  Signs/Symptoms:carotid artery stenosis.    COMPARISON:  None.    ACCESSION NUMBER(S):  [id number]    ORDERING CLINICIAN:  KARLA ZUNIGA    TECHNIQUE:  After the administration 80 mL Omnipaque 350 axial images of the neck  were acquired.  Coronal, sagittal, and 3-D reconstructions were  provided for review.    FINDINGS:    CTA NECK FINDINGS:    Aorta and great vessels: Normal 3 vessel branching pattern. There is  kinking of the proximal left subclavian artery and tortuous origin of  the left vertebral artery.    Right carotid vessels: The common carotid artery is normal. Mild  athero sclerotic calcification of the carotid bifurcation. There is a  short segment of luminal irregularity involving the posterior wall at  the carotid bifurcation extending slightly into the internal carotid  artery. There is mild luminal stenosis. Retropharyngeal course of the  internal carotid artery. No additional sites of luminal stenosis    Left carotid vessels: Proximal and mid common carotid arteries are  patent. There is a short segment of high-grade stenosis involving the  distal common " carotid artery just proximal to the bifurcation  measuring approximately 5 mm in length. There is post stenotic  dilatation of the proximal internal carotid artery. The remainder of  the internal carotid artery is patent.    Vertebral vessels:  The visualized segments of the cervical vertebral  arteries are normal in caliber.    Nonspecific bilateral prominent cervical lymph nodes likely reactive.    Impression  1. There is a short segment of high-grade stenosis involving the left  distal common carotid artery just proximal to the bifurcation with  post stenotic dilatation of the proximal internal carotid artery. The  remainder of the left internal carotid artery is patent without focal  stenosis.  2. Questionable short segment of ulcerated plaque versus dissection  involving the right carotid bifurcation extending into the proximal  internal carotid artery with resulting mild luminal stenosis.    I personally reviewed the images/study and I agree with the findings  as stated by Resident Betty Constantino. This study was interpreted at  [facility], [place], [place].    MACRO:  None    Signed by: Daljit Davis 5/20/2024 8:52 PM  Dictation workstation:   [id number]        Assessment & Plan:  1. Internal carotid artery stenosis, left  Vascular US carotid artery duplex bilateral        Left carotid artery stenosis, asymptomatic.   CTA neck shows approximately 55% left ICA stenosis (measuring left ICA diameter past post-stenotic dilation).   Continue aspirin & statin   Blood pressure control  Glucose control  RTC in 6 months with carotid duplex     Jaimie Washington, APRN-CNP

## 2024-06-27 DIAGNOSIS — E11.42 DIABETIC POLYNEUROPATHY ASSOCIATED WITH TYPE 2 DIABETES MELLITUS (MULTI): Primary | ICD-10-CM

## 2024-06-28 DIAGNOSIS — R06.02 SOB (SHORTNESS OF BREATH): ICD-10-CM

## 2024-06-28 RX ORDER — GABAPENTIN 100 MG/1
200 CAPSULE ORAL NIGHTLY
Qty: 60 CAPSULE | Refills: 3 | Status: SHIPPED | OUTPATIENT
Start: 2024-06-28

## 2024-06-28 NOTE — TELEPHONE ENCOUNTER
Pt called requesting refills for albuterol inhaler to go to [facility] on [address]. Order pended and routed to provider.

## 2024-07-01 RX ORDER — ALBUTEROL SULFATE 90 UG/1
2 AEROSOL, METERED RESPIRATORY (INHALATION) EVERY 4 HOURS PRN
Qty: 18 G | Refills: 3 | Status: SHIPPED | OUTPATIENT
Start: 2024-07-01

## 2024-07-10 ENCOUNTER — HOSPITAL ENCOUNTER (OUTPATIENT)
Dept: CARDIOLOGY | Facility: HOSPITAL | Age: 64
Discharge: HOME | End: 2024-07-10
Payer: COMMERCIAL

## 2024-07-10 ENCOUNTER — TELEPHONE (OUTPATIENT)
Dept: PRIMARY CARE | Facility: CLINIC | Age: 64
End: 2024-07-10

## 2024-07-10 DIAGNOSIS — R06.02 SOB (SHORTNESS OF BREATH): ICD-10-CM

## 2024-07-10 DIAGNOSIS — M25.562 ARTHRALGIA OF BOTH KNEES: ICD-10-CM

## 2024-07-10 DIAGNOSIS — R07.9 CHEST PAIN, UNSPECIFIED TYPE: ICD-10-CM

## 2024-07-10 DIAGNOSIS — M25.561 ARTHRALGIA OF BOTH KNEES: ICD-10-CM

## 2024-07-10 DIAGNOSIS — R06.09 DYSPNEA ON EXERTION: ICD-10-CM

## 2024-07-10 DIAGNOSIS — I65.22 INTERNAL CAROTID ARTERY STENOSIS, LEFT: ICD-10-CM

## 2024-07-10 LAB
AORTIC VALVE MEAN GRADIENT: 3 MMHG
AORTIC VALVE PEAK VELOCITY: 1.13 M/S
AV PEAK GRADIENT: 5.1 MMHG
AVA (PEAK VEL): 1.59 CM2
AVA (VTI): 1.62 CM2
EJECTION FRACTION APICAL 4 CHAMBER: 64.1
EJECTION FRACTION: 69 %
LEFT ATRIUM VOLUME AREA LENGTH INDEX BSA: 23.1 ML/M2
LEFT VENTRICLE INTERNAL DIMENSION DIASTOLE: 3.9 CM (ref 3.5–6)
LEFT VENTRICULAR OUTFLOW TRACT DIAMETER: 1.78 CM
MITRAL VALVE E/A RATIO: 1.45
MITRAL VALVE E/E' RATIO: 7.47
RIGHT VENTRICLE FREE WALL PEAK S': 12 CM/S
RIGHT VENTRICLE PEAK SYSTOLIC PRESSURE: 41.8 MMHG
TRICUSPID ANNULAR PLANE SYSTOLIC EXCURSION: 2 CM

## 2024-07-10 PROCEDURE — 93306 TTE W/DOPPLER COMPLETE: CPT

## 2024-07-10 PROCEDURE — 93306 TTE W/DOPPLER COMPLETE: CPT | Performed by: INTERNAL MEDICINE

## 2024-07-10 NOTE — TELEPHONE ENCOUNTER
Pt came in to the office asking about her results from her echo. Please give her a call [phone] thank you!

## 2024-09-09 ENCOUNTER — TELEPHONE (OUTPATIENT)
Dept: PRIMARY CARE | Facility: CLINIC | Age: 64
End: 2024-09-09
Payer: COMMERCIAL

## 2024-09-09 NOTE — TELEPHONE ENCOUNTER
Noted pt scheduled for 2 appts tomorrow. One virtual at 1130 and one in clinic at 2pm. Call placed to ppt to clarify need of appts. Pt confirms she will utilizing virtual appt at 1130, ok to cancel 2pm appt. Appt canceled per pt confirmation.

## 2024-09-10 ENCOUNTER — APPOINTMENT (OUTPATIENT)
Dept: PRIMARY CARE | Facility: CLINIC | Age: 64
End: 2024-09-10
Payer: COMMERCIAL

## 2024-09-10 DIAGNOSIS — H91.90 DECREASED HEARING, UNSPECIFIED LATERALITY: ICD-10-CM

## 2024-09-10 DIAGNOSIS — R19.7 DIARRHEA, UNSPECIFIED TYPE: Primary | ICD-10-CM

## 2024-09-10 PROCEDURE — 4010F ACE/ARB THERAPY RXD/TAKEN: CPT

## 2024-09-10 PROCEDURE — 3044F HG A1C LEVEL LT 7.0%: CPT

## 2024-09-10 PROCEDURE — 3048F LDL-C <100 MG/DL: CPT

## 2024-09-10 PROCEDURE — 99213 OFFICE O/P EST LOW 20 MIN: CPT

## 2024-09-10 PROCEDURE — 3061F NEG MICROALBUMINURIA REV: CPT

## 2024-09-10 NOTE — PROGRESS NOTES
Virtual or Telephone Consent    A telephone visit (audio only) between the patient (at the originating site) and the provider (at the distant site) was utilized to provide this telehealth service.     Verbal consent was requested and obtained from Mari Dowling on this date, 09/10/24 for a telehealth visit.     Subjective   Patient ID: Mari Dowling is a 64 y.o. female who presents for Follow-up.    HPI  - would like to discuss imaging results and plan   - advised about ultrasound findings and echo previously discussed    #Chronic diarrhea  - see previous notes for hx  - persisting, says she is taking daily imodium which limits number of BMs   - drinking lactose free milk as well, but still has soft Bms  - only imodium helps to reduce symptoms   - previous stool o/p and celiac panel 5/8 all wnl   - wants to see GI     Patient Active Problem List   Diagnosis    Acute kidney injury (CMS-HCC)    Allergic rhinitis    Anemia    Anxiety    Arthralgia    CKD (chronic kidney disease) stage 3, GFR 30-59 ml/min (Multi)    Constipation    Cough    Depression    Diabetic neuropathy (Multi)    Diverticulosis    Dry eyes, bilateral    DVD (dissociated vertical deviation)    Esotropia of right eye    GERD (gastroesophageal reflux disease)    Helicobacter pylori gastritis    Hematemesis    Hiatal hernia    Hyperlipidemia    Hypertension    Combined form of age-related cataract, left eye    Combined form of age-related cataract, right eye    PCO (posterior capsular opacification), left    PCO (posterior capsular opacification), right    Peptic ulcer disease    Primary open angle glaucoma (POAG) of both eyes, indeterminate stage    Pseudophakia of both eyes    Simple renal cyst    Body aches    Hyperopia with presbyopia of both eyes    Overweight with body mass index (BMI) of 27 to 27.9 in adult    Low back pain    Hypertensive CKD (chronic kidney disease)    Bleeding    Inflammation of stomach and intestine    Injury of kidney     "Irritant contact dermatitis, unspecified cause    Nuclear sclerosis    Viral upper respiratory tract infection      Current Outpatient Medications   Medication Instructions    8 Hour Pain Reliever 650 mg ER tablet TAKE 1 TABLET(650 MG) BY MOUTH EVERY MORNING, 1 TABLET AT NOON AND 1 TABLET AT BEDTIME. DO NOT CRUSH, CHEW, OR SPLIT    albuterol 90 mcg/actuation inhaler 2 puffs, inhalation, Every 4 hours PRN    aspirin 81 mg, oral, Daily    atorvastatin (LIPITOR) 40 mg, oral, Nightly    docusate sodium (COLACE) 100 mg, oral, 2 times daily PRN    fluticasone propion-salmeteroL (Advair Diskus) 100-50 mcg/dose diskus inhaler 1 puff, inhalation, 2 times daily RT, Rinse mouth with water after use to reduce aftertaste and incidence of candidiasis. Do not swallow.    FREESTYLE LANCETS MISC miscellaneous, Daily    gabapentin (NEURONTIN) 200 mg, oral, Nightly    GaviLyte-C 240-22.72-6.72 -5.84 gram solution     hydroCHLOROthiazide (HYDRODIURIL) 12.5 mg, oral, Daily    hydrocortisone 2.5 % ointment 1 Application    lidocaine (Xylocaine) 5 % ointment Topical, As needed, Apple To Effected Area As Directed    lisinopril 40 mg, oral, Daily    OneTouch Ultra Test strip USE AS DIRECTED.    pantoprazole (PROTONIX) 40 mg, oral, Daily before breakfast    pen needle 1/2\" 29G X 12mm needle Use as instructed    Trulicity 1.5 mg, subcutaneous, Once Weekly      Past Surgical History:   Procedure Laterality Date    CATARACT EXTRACTION  2017    Cataract Extraction    HYSTERECTOMY  2018    Hysterectomy    OTHER SURGICAL HISTORY  2021     section      No Known Allergies   Social History     Tobacco Use    Smoking status: Never    Smokeless tobacco: Never   Substance Use Topics    Alcohol use: Never    Drug use: Never      Family History   Problem Relation Name Age of Onset    Hypertension Mother      Lung cancer Father      Other (Diabetes Mellitus) Sister      Colon cancer Father's Sister      Colon cancer Paternal " Grandmother      Breast cancer Other Aunt         Review of Systems  Ten point review of systems negative unless specified in HPI.     Objective   Vitals: There were no vitals taken for this visit.     Physical Exam  Deferred for telephone visit     Assessment/Plan   65 yo F presenting for follow-up.     Carotid artery stenosis  - following with vascular surgery   - medical management with statin and aspirin, BP, and A1c management  - f/up planned in 12/24 for repeat U/S    Diarrhea, unspecified type  -     Referral to Gastroenterology; Future    Decreased hearing, unspecified laterality  -     Referral to Audiology; Future        RTC in 3-6 mos, or earlier as needed.  Attending Supervision: discussed with attending physician Dr. Gallego.    Lori Barillas MD  Family Medicine Resident.

## 2024-09-13 NOTE — PROGRESS NOTES
I reviewed the resident's documentation and discussed the patient with the resident. I agree with the resident's medical decision making as documented in the note.    Leon Gallego MD

## 2024-09-19 ENCOUNTER — APPOINTMENT (OUTPATIENT)
Dept: OPHTHALMOLOGY | Facility: CLINIC | Age: 64
End: 2024-09-19
Payer: COMMERCIAL

## 2024-10-10 NOTE — PROGRESS NOTES
10/11/2024 CC: 64 y.o. presents for glaucoma evaluation.    Past ocular history: Pseudophakia OU  Family history: no family history of glaucoma   Past medical history: HTN, CKD, DM,  Asthma, others per chart   Social history: denies tobacco     Eye medications:   Both eyes: none    Allergy:  As per chart     Testing:      F 24-2 7/9/2019(F (-) 16% OU): OD - inferior arcuate with superior nasal step - does not correlate with clinical exam. OS - lens rim artifact; does not correlate to NFL anatomy   HVF 24-2 10/11/2024:  FL, gen depression OU- unreliable.    OCT 3/28/2019: OD- thin IT, bdl ST, avg 86. OS- bdl IT, avg 91 um  OCT 10/11/2024: OD- bdl N/T, avg 77. OS- full, avg 82 um. GCA-OD- thin ST, OS full    Pachymetry 10/11/2024: 580/578    Gonioscopy 10/11/2024: open OU    Tmax: 20s    Assessment:   Glaucoma suspect   - Seen by Dr Trevino 7/9/2019: Patient has a baseline involuntary micro-nystagmus of eyes during exam.  Low risk for glaucoma   -Negative fhx  -Thicker cornea  - Testing: OCT-unremarkable-stable. HVF: unreliable, inattentive, patient was dilated  Target IOP: normal  -IOP 10/11/2024: 16 OU    Pseudophakia OU    RE  - Ast., near add  - Mrx     T2DM wo     Strabismus  - ET OS, DVD        Plan:   I explained my findings. GS, monitor    Eye medications:   Both eyes: none    Return in 9 mo, update testing

## 2024-10-11 ENCOUNTER — APPOINTMENT (OUTPATIENT)
Dept: OPHTHALMOLOGY | Facility: CLINIC | Age: 64
End: 2024-10-11
Payer: COMMERCIAL

## 2024-10-11 DIAGNOSIS — H40.003 GLAUCOMA SUSPECT OF BOTH EYES: Primary | ICD-10-CM

## 2024-10-11 ASSESSMENT — EXTERNAL EXAM - RIGHT EYE: OD_EXAM: NORMAL

## 2024-10-11 ASSESSMENT — GONIOSCOPY
OD_INFERIOR: SS
OS_SUPERIOR: SS
OS_NASAL: SS
OS_INFERIOR: SS
OD_SUPERIOR: SS
OS_TEMPORAL: SS
OD_NASAL: SS
OD_TEMPORAL: SS

## 2024-10-11 ASSESSMENT — TONOMETRY
OS_IOP_MMHG: 16
IOP_METHOD: GOLDMANN APPLANATION
OD_IOP_MMHG: 16

## 2024-10-11 ASSESSMENT — REFRACTION_MANIFEST
OS_SPHERE: +0.25
METHOD_AUTOREFRACTION: 1
OD_ADD: +2.50
OD_CYLINDER: -1.50
OD_SPHERE: +0.50
OS_CYLINDER: -1.50
OD_AXIS: 130
OD_CYLINDER: -1.25
OS_AXIS: 055
OS_AXIS: 055
OS_SPHERE: +0.75
OD_AXIS: 100
OS_CYLINDER: -1.75
OD_SPHERE: +0.50
OS_ADD: +2.50

## 2024-10-11 ASSESSMENT — CONF VISUAL FIELD
OD_SUPERIOR_NASAL_RESTRICTION: 0
OS_INFERIOR_NASAL_RESTRICTION: 0
OS_NORMAL: 1
OD_SUPERIOR_TEMPORAL_RESTRICTION: 0
OS_SUPERIOR_NASAL_RESTRICTION: 0
OD_NORMAL: 1
OS_SUPERIOR_TEMPORAL_RESTRICTION: 0
OD_INFERIOR_TEMPORAL_RESTRICTION: 0
OS_INFERIOR_TEMPORAL_RESTRICTION: 0
OD_INFERIOR_NASAL_RESTRICTION: 0

## 2024-10-11 ASSESSMENT — CUP TO DISC RATIO
OS_RATIO: 0.35
OD_RATIO: 0.5

## 2024-10-11 ASSESSMENT — SLIT LAMP EXAM - LIDS
COMMENTS: GOOD POSITION, MILD MGD
COMMENTS: GOOD POSITION, MILD MGD

## 2024-10-11 ASSESSMENT — VISUAL ACUITY
OD_SC: 20/40+1
OD_PH_SC: 20/25-1
OS_PH_SC: 20/30
OS_SC: 20/40-2
METHOD: SNELLEN - LINEAR

## 2024-10-11 ASSESSMENT — PACHYMETRY
OS_CT(UM): 578
OD_CT(UM): 580

## 2024-10-11 ASSESSMENT — EXTERNAL EXAM - LEFT EYE: OS_EXAM: NORMAL

## 2024-11-04 DIAGNOSIS — E11.42 DIABETIC POLYNEUROPATHY ASSOCIATED WITH TYPE 2 DIABETES MELLITUS (MULTI): ICD-10-CM

## 2024-11-07 RX ORDER — GABAPENTIN 100 MG/1
CAPSULE ORAL
Qty: 60 CAPSULE | Refills: 3 | Status: SHIPPED | OUTPATIENT
Start: 2024-11-07

## 2024-12-06 ENCOUNTER — APPOINTMENT (OUTPATIENT)
Dept: PRIMARY CARE | Facility: CLINIC | Age: 64
End: 2024-12-06
Payer: COMMERCIAL

## 2024-12-11 ENCOUNTER — TELEPHONE (OUTPATIENT)
Dept: PRIMARY CARE | Facility: CLINIC | Age: 64
End: 2024-12-11

## 2024-12-11 ENCOUNTER — HOSPITAL ENCOUNTER (OUTPATIENT)
Dept: VASCULAR MEDICINE | Facility: HOSPITAL | Age: 64
Discharge: HOME | End: 2024-12-11
Payer: COMMERCIAL

## 2024-12-11 ENCOUNTER — OFFICE VISIT (OUTPATIENT)
Dept: VASCULAR SURGERY | Facility: HOSPITAL | Age: 64
End: 2024-12-11
Payer: COMMERCIAL

## 2024-12-11 VITALS
DIASTOLIC BLOOD PRESSURE: 76 MMHG | WEIGHT: 138 LBS | SYSTOLIC BLOOD PRESSURE: 117 MMHG | HEART RATE: 86 BPM | BODY MASS INDEX: 27.82 KG/M2 | OXYGEN SATURATION: 96 % | HEIGHT: 59 IN

## 2024-12-11 DIAGNOSIS — E11.9 INSULIN DEPENDENT TYPE 2 DIABETES MELLITUS (MULTI): ICD-10-CM

## 2024-12-11 DIAGNOSIS — I65.23 ASYMPTOMATIC BILATERAL CAROTID ARTERY STENOSIS: Primary | ICD-10-CM

## 2024-12-11 DIAGNOSIS — I65.23 ASYMPTOMATIC BILATERAL CAROTID ARTERY STENOSIS: ICD-10-CM

## 2024-12-11 DIAGNOSIS — Z79.4 INSULIN DEPENDENT TYPE 2 DIABETES MELLITUS (MULTI): ICD-10-CM

## 2024-12-11 DIAGNOSIS — I65.22 STENOSIS OF LEFT CAROTID ARTERY: ICD-10-CM

## 2024-12-11 PROCEDURE — 93880 EXTRACRANIAL BILAT STUDY: CPT

## 2024-12-11 PROCEDURE — 99215 OFFICE O/P EST HI 40 MIN: CPT | Performed by: SURGERY

## 2024-12-11 PROCEDURE — 3048F LDL-C <100 MG/DL: CPT | Performed by: SURGERY

## 2024-12-11 PROCEDURE — 3008F BODY MASS INDEX DOCD: CPT | Performed by: SURGERY

## 2024-12-11 PROCEDURE — 3044F HG A1C LEVEL LT 7.0%: CPT | Performed by: SURGERY

## 2024-12-11 PROCEDURE — 93880 EXTRACRANIAL BILAT STUDY: CPT | Performed by: INTERNAL MEDICINE

## 2024-12-11 PROCEDURE — 3074F SYST BP LT 130 MM HG: CPT | Performed by: SURGERY

## 2024-12-11 PROCEDURE — 3078F DIAST BP <80 MM HG: CPT | Performed by: SURGERY

## 2024-12-11 PROCEDURE — 4010F ACE/ARB THERAPY RXD/TAKEN: CPT | Performed by: SURGERY

## 2024-12-11 PROCEDURE — 3061F NEG MICROALBUMINURIA REV: CPT | Performed by: SURGERY

## 2024-12-11 ASSESSMENT — PATIENT HEALTH QUESTIONNAIRE - PHQ9
2. FEELING DOWN, DEPRESSED OR HOPELESS: NOT AT ALL
1. LITTLE INTEREST OR PLEASURE IN DOING THINGS: NOT AT ALL
SUM OF ALL RESPONSES TO PHQ9 QUESTIONS 1 AND 2: 0

## 2024-12-11 ASSESSMENT — COLUMBIA-SUICIDE SEVERITY RATING SCALE - C-SSRS
2. HAVE YOU ACTUALLY HAD ANY THOUGHTS OF KILLING YOURSELF?: NO
6. HAVE YOU EVER DONE ANYTHING, STARTED TO DO ANYTHING, OR PREPARED TO DO ANYTHING TO END YOUR LIFE?: NO
1. IN THE PAST MONTH, HAVE YOU WISHED YOU WERE DEAD OR WISHED YOU COULD GO TO SLEEP AND NOT WAKE UP?: NO

## 2024-12-11 ASSESSMENT — PAIN SCALES - GENERAL: PAINLEVEL_OUTOF10: 0-NO PAIN

## 2024-12-11 NOTE — PROGRESS NOTES
"History Of Present Illness  Mari Dowling is a 64 y.o. female who presents in follow-up of carotid artery stenosis.  She was last seen on June 5, 2024.  She denies any symptoms of amaurosis fugax or TIA.  She reports some lightheadedness.     Surgical History    Social History  She reports that she has never smoked. She has never used smokeless tobacco. She reports that she does not drink alcohol and does not use drugs.    Allergies  Patient has no known allergies.    Review of Systems  Noncontributory.    Physical Exam  Gen: alert and awake. Oriented to time, place and person. In no acute distress.  HEENT: normocephalic, sclera non icteric. EOMI. Supple without lymphadenopathy  Cor: RRR,   Lung: Unlabored breathing.  Abd: soft, non tender and non distended.  No evidence of hepatosplenomegaly  Ext:   Pulse Exam:    Carotid Subclav Brach Rad Fem Pop DP PT   Right 4+ 4+ 4+ 4+ 4+  3+    Left 4+ 4+ 4+ 4+ 4+  3+    Psych: Normal    Last Recorded Vitals  Blood pressure 117/76, pulse 86, height 1.499 m (4' 11\"), weight 62.6 kg (138 lb), SpO2 96%.       Assessment/Plan   Asymptomatic carotid artery stenosis.    Repeat duplex today showed no significant changes.    RTC in 6 months with repeat duplex carotid.  Continue with ASA 81 mg.  Continue with Statin     Fredi Mitchell MD  Professor & Chief, Division of Vascular Surgery & Endovascular Therapy  , Vascular Residency & Fellowship Training Programs  [facility] [place]  [facility] [place]    "

## 2024-12-12 RX ORDER — ATORVASTATIN CALCIUM 40 MG/1
40 TABLET, FILM COATED ORAL NIGHTLY
Qty: 90 TABLET | Refills: 3 | Status: SHIPPED | OUTPATIENT
Start: 2024-12-12

## 2024-12-14 ENCOUNTER — PATIENT MESSAGE (OUTPATIENT)
Dept: PRIMARY CARE | Facility: CLINIC | Age: 64
End: 2024-12-14
Payer: COMMERCIAL

## 2024-12-14 DIAGNOSIS — M54.50 CHRONIC MIDLINE LOW BACK PAIN WITHOUT SCIATICA: ICD-10-CM

## 2024-12-14 DIAGNOSIS — G89.29 CHRONIC MIDLINE LOW BACK PAIN WITHOUT SCIATICA: ICD-10-CM

## 2024-12-16 RX ORDER — DEXTROMETHORPHAN HYDROBROMIDE, GUAIFENESIN 5; 100 MG/5ML; MG/5ML
650 LIQUID ORAL EVERY 8 HOURS PRN
Qty: 30 TABLET | Refills: 3 | Status: SHIPPED | OUTPATIENT
Start: 2024-12-16

## 2024-12-16 RX ORDER — LIDOCAINE 50 MG/G
OINTMENT TOPICAL AS NEEDED
Qty: 60 G | Refills: 11 | Status: SHIPPED | OUTPATIENT
Start: 2024-12-16

## 2025-01-13 DIAGNOSIS — I65.22 INTERNAL CAROTID ARTERY STENOSIS, LEFT: ICD-10-CM

## 2025-01-13 DIAGNOSIS — I10 PRIMARY HYPERTENSION: ICD-10-CM

## 2025-01-13 DIAGNOSIS — E11.9 INSULIN DEPENDENT TYPE 2 DIABETES MELLITUS (MULTI): ICD-10-CM

## 2025-01-13 DIAGNOSIS — G89.29 CHRONIC MIDLINE LOW BACK PAIN WITHOUT SCIATICA: ICD-10-CM

## 2025-01-13 DIAGNOSIS — K21.9 GASTROESOPHAGEAL REFLUX DISEASE, UNSPECIFIED WHETHER ESOPHAGITIS PRESENT: ICD-10-CM

## 2025-01-13 DIAGNOSIS — M54.50 CHRONIC MIDLINE LOW BACK PAIN WITHOUT SCIATICA: ICD-10-CM

## 2025-01-13 DIAGNOSIS — E11.65 UNCONTROLLED TYPE 2 DIABETES MELLITUS WITH HYPERGLYCEMIA: ICD-10-CM

## 2025-01-13 DIAGNOSIS — R06.02 SOB (SHORTNESS OF BREATH): ICD-10-CM

## 2025-01-13 DIAGNOSIS — I10 HYPERTENSION, UNSPECIFIED TYPE: ICD-10-CM

## 2025-01-13 DIAGNOSIS — E11.42 DIABETIC POLYNEUROPATHY ASSOCIATED WITH TYPE 2 DIABETES MELLITUS (MULTI): ICD-10-CM

## 2025-01-13 DIAGNOSIS — Z79.4 INSULIN DEPENDENT TYPE 2 DIABETES MELLITUS (MULTI): ICD-10-CM

## 2025-01-13 NOTE — TELEPHONE ENCOUNTER
[facility] called stating they are pt new preferred mail order pharmacy and are requesting refill of pt lidocaine ointment, aspirin, atorvastatin, gabapentin, Trulicity, albuterol inhaler, lisinopril, hydrochlorothiazide, and pantoprazole. Orders pended and routed to provider.

## 2025-01-16 RX ORDER — ASPIRIN 81 MG/1
81 TABLET ORAL DAILY
Qty: 90 TABLET | Refills: 3 | Status: SHIPPED | OUTPATIENT
Start: 2025-01-16 | End: 2026-01-16

## 2025-01-16 RX ORDER — HYDROCHLOROTHIAZIDE 25 MG/1
12.5 TABLET ORAL DAILY
Qty: 90 TABLET | Refills: 3 | Status: SHIPPED | OUTPATIENT
Start: 2025-01-16

## 2025-01-16 RX ORDER — ATORVASTATIN CALCIUM 40 MG/1
40 TABLET, FILM COATED ORAL NIGHTLY
Qty: 90 TABLET | Refills: 3 | Status: SHIPPED | OUTPATIENT
Start: 2025-01-16

## 2025-01-16 RX ORDER — LIDOCAINE 50 MG/G
OINTMENT TOPICAL AS NEEDED
Qty: 60 G | Refills: 11 | Status: SHIPPED | OUTPATIENT
Start: 2025-01-16

## 2025-01-16 RX ORDER — GABAPENTIN 300 MG/1
300 CAPSULE ORAL NIGHTLY
Qty: 90 CAPSULE | Refills: 0 | Status: SHIPPED | OUTPATIENT
Start: 2025-01-16

## 2025-01-16 RX ORDER — PANTOPRAZOLE SODIUM 40 MG/1
40 TABLET, DELAYED RELEASE ORAL
Qty: 90 TABLET | Refills: 3 | Status: SHIPPED | OUTPATIENT
Start: 2025-01-16

## 2025-01-16 RX ORDER — DULAGLUTIDE 1.5 MG/.5ML
1.5 INJECTION, SOLUTION SUBCUTANEOUS
Qty: 2 ML | Refills: 11 | Status: SHIPPED | OUTPATIENT
Start: 2025-01-19

## 2025-01-16 RX ORDER — ALBUTEROL SULFATE 90 UG/1
2 INHALANT RESPIRATORY (INHALATION) EVERY 4 HOURS PRN
Qty: 18 G | Refills: 3 | Status: SHIPPED | OUTPATIENT
Start: 2025-01-16

## 2025-01-16 RX ORDER — LISINOPRIL 40 MG/1
40 TABLET ORAL DAILY
Qty: 90 TABLET | Refills: 3 | Status: SHIPPED | OUTPATIENT
Start: 2025-01-16

## 2025-02-11 ENCOUNTER — APPOINTMENT (OUTPATIENT)
Dept: PRIMARY CARE | Facility: CLINIC | Age: 65
End: 2025-02-11
Payer: COMMERCIAL

## 2025-02-11 VITALS
OXYGEN SATURATION: 100 % | BODY MASS INDEX: 28.91 KG/M2 | WEIGHT: 143.4 LBS | TEMPERATURE: 96.2 F | DIASTOLIC BLOOD PRESSURE: 84 MMHG | SYSTOLIC BLOOD PRESSURE: 135 MMHG | HEIGHT: 59 IN | HEART RATE: 85 BPM

## 2025-02-11 DIAGNOSIS — R06.02 SOB (SHORTNESS OF BREATH): ICD-10-CM

## 2025-02-11 DIAGNOSIS — I10 HYPERTENSION, UNSPECIFIED TYPE: ICD-10-CM

## 2025-02-11 DIAGNOSIS — E11.9 INSULIN DEPENDENT TYPE 2 DIABETES MELLITUS (MULTI): ICD-10-CM

## 2025-02-11 DIAGNOSIS — E11.65 UNCONTROLLED TYPE 2 DIABETES MELLITUS WITH HYPERGLYCEMIA: ICD-10-CM

## 2025-02-11 DIAGNOSIS — D64.9 ANEMIA, UNSPECIFIED TYPE: ICD-10-CM

## 2025-02-11 DIAGNOSIS — K21.9 GASTROESOPHAGEAL REFLUX DISEASE, UNSPECIFIED WHETHER ESOPHAGITIS PRESENT: ICD-10-CM

## 2025-02-11 DIAGNOSIS — R06.09 DYSPNEA ON EXERTION: ICD-10-CM

## 2025-02-11 DIAGNOSIS — J45.909 ASTHMA, UNSPECIFIED ASTHMA SEVERITY, UNSPECIFIED WHETHER COMPLICATED, UNSPECIFIED WHETHER PERSISTENT (HHS-HCC): Primary | ICD-10-CM

## 2025-02-11 DIAGNOSIS — I10 PRIMARY HYPERTENSION: ICD-10-CM

## 2025-02-11 DIAGNOSIS — Z79.4 INSULIN DEPENDENT TYPE 2 DIABETES MELLITUS (MULTI): ICD-10-CM

## 2025-02-11 DIAGNOSIS — I65.22 INTERNAL CAROTID ARTERY STENOSIS, LEFT: ICD-10-CM

## 2025-02-11 RX ORDER — ALBUTEROL SULFATE 0.83 MG/ML
3 SOLUTION RESPIRATORY (INHALATION) ONCE
OUTPATIENT
Start: 2025-02-11 | End: 2025-02-11

## 2025-02-11 RX ORDER — ALBUTEROL SULFATE 90 UG/1
2 INHALANT RESPIRATORY (INHALATION) EVERY 4 HOURS PRN
Qty: 18 G | Refills: 3 | Status: SHIPPED | OUTPATIENT
Start: 2025-02-11 | End: 2025-02-18

## 2025-02-11 RX ORDER — HYDROCHLOROTHIAZIDE 25 MG/1
12.5 TABLET ORAL DAILY
Qty: 90 TABLET | Refills: 3 | Status: SHIPPED | OUTPATIENT
Start: 2025-02-11

## 2025-02-11 RX ORDER — DULAGLUTIDE 1.5 MG/.5ML
1.5 INJECTION, SOLUTION SUBCUTANEOUS
Qty: 2 ML | Refills: 11 | Status: SHIPPED | OUTPATIENT
Start: 2025-02-11

## 2025-02-11 RX ORDER — PANTOPRAZOLE SODIUM 40 MG/1
40 TABLET, DELAYED RELEASE ORAL
Qty: 90 TABLET | Refills: 3 | Status: SHIPPED | OUTPATIENT
Start: 2025-02-11

## 2025-02-11 RX ORDER — ASPIRIN 81 MG/1
81 TABLET ORAL DAILY
Qty: 90 TABLET | Refills: 3 | Status: SHIPPED | OUTPATIENT
Start: 2025-02-11 | End: 2026-02-11

## 2025-02-11 RX ORDER — ALBUTEROL SULFATE 90 UG/1
1 INHALANT RESPIRATORY (INHALATION) ONCE
OUTPATIENT
Start: 2025-02-11

## 2025-02-11 RX ORDER — LISINOPRIL 40 MG/1
40 TABLET ORAL DAILY
Qty: 90 TABLET | Refills: 3 | Status: SHIPPED | OUTPATIENT
Start: 2025-02-11

## 2025-02-11 RX ORDER — FLUTICASONE PROPIONATE AND SALMETEROL 250; 50 UG/1; UG/1
1 POWDER RESPIRATORY (INHALATION)
Qty: 60 EACH | Refills: 11 | Status: SHIPPED | OUTPATIENT
Start: 2025-02-11 | End: 2026-02-11

## 2025-02-11 RX ORDER — ATORVASTATIN CALCIUM 40 MG/1
40 TABLET, FILM COATED ORAL NIGHTLY
Qty: 90 TABLET | Refills: 3 | Status: SHIPPED | OUTPATIENT
Start: 2025-02-11

## 2025-02-11 ASSESSMENT — PATIENT HEALTH QUESTIONNAIRE - PHQ9
SUM OF ALL RESPONSES TO PHQ9 QUESTIONS 1 AND 2: 0
2. FEELING DOWN, DEPRESSED OR HOPELESS: NOT AT ALL
1. LITTLE INTEREST OR PLEASURE IN DOING THINGS: NOT AT ALL

## 2025-02-11 ASSESSMENT — COLUMBIA-SUICIDE SEVERITY RATING SCALE - C-SSRS
1. IN THE PAST MONTH, HAVE YOU WISHED YOU WERE DEAD OR WISHED YOU COULD GO TO SLEEP AND NOT WAKE UP?: NO
6. HAVE YOU EVER DONE ANYTHING, STARTED TO DO ANYTHING, OR PREPARED TO DO ANYTHING TO END YOUR LIFE?: NO
2. HAVE YOU ACTUALLY HAD ANY THOUGHTS OF KILLING YOURSELF?: NO

## 2025-02-11 ASSESSMENT — ENCOUNTER SYMPTOMS
LOSS OF SENSATION IN FEET: 0
DEPRESSION: 0
OCCASIONAL FEELINGS OF UNSTEADINESS: 0

## 2025-02-11 ASSESSMENT — PAIN SCALES - GENERAL: PAINLEVEL_OUTOF10: 0-NO PAIN

## 2025-02-17 DIAGNOSIS — R06.02 SOB (SHORTNESS OF BREATH): ICD-10-CM

## 2025-02-18 ENCOUNTER — APPOINTMENT (OUTPATIENT)
Dept: RESPIRATORY THERAPY | Facility: HOSPITAL | Age: 65
End: 2025-02-18
Payer: COMMERCIAL

## 2025-02-18 RX ORDER — ALBUTEROL SULFATE 90 UG/1
INHALANT RESPIRATORY (INHALATION)
Qty: 18 G | Refills: 3 | Status: SHIPPED | OUTPATIENT
Start: 2025-02-18

## 2025-02-19 ENCOUNTER — APPOINTMENT (OUTPATIENT)
Dept: RESPIRATORY THERAPY | Facility: HOSPITAL | Age: 65
End: 2025-02-19
Payer: COMMERCIAL

## 2025-02-19 DIAGNOSIS — G89.29 CHRONIC MIDLINE LOW BACK PAIN WITHOUT SCIATICA: ICD-10-CM

## 2025-02-19 DIAGNOSIS — M54.50 CHRONIC MIDLINE LOW BACK PAIN WITHOUT SCIATICA: ICD-10-CM

## 2025-02-19 RX ORDER — LIDOCAINE 50 MG/G
OINTMENT TOPICAL 3 TIMES DAILY PRN
Qty: 50 G | Refills: 11 | Status: SHIPPED | OUTPATIENT
Start: 2025-02-19

## 2025-02-19 NOTE — PROGRESS NOTES
Subjective   Patient ID: Mari Dowling is a 65 y.o. female who presents for Follow-up (Tired when walking and shortness of breath ).    HPI  Symptoms progressive for several weeks, primarily with walking up stairs. Denies orthopnea. No chest pain, palpitations, cough, fevers, or lower extremity swelling. No sick contacts. Symptoms improve with albuterol, but only intermittently. No smoking hx.     Patient Active Problem List   Diagnosis    Acute kidney injury ([facility])    Allergic rhinitis    Anemia    Anxiety    Arthralgia    CKD (chronic kidney disease) stage 3, GFR 30-59 ml/min (Multi)    Constipation    Cough    Depression    Diabetic neuropathy (Multi)    Diverticulosis    Dry eyes, bilateral    DVD (dissociated vertical deviation)    Esotropia of right eye    GERD (gastroesophageal reflux disease)    Helicobacter pylori gastritis    Hematemesis    Hiatal hernia    Hyperlipidemia    Hypertension    Combined form of age-related cataract, left eye    Combined form of age-related cataract, right eye    PCO (posterior capsular opacification), left    PCO (posterior capsular opacification), right    Peptic ulcer disease    Primary open angle glaucoma (POAG) of both eyes, indeterminate stage    Pseudophakia of both eyes    Simple renal cyst    Body aches    Hyperopia with presbyopia of both eyes    Overweight with body mass index (BMI) of 27 to 27.9 in adult    Low back pain    Hypertensive CKD (chronic kidney disease)    Bleeding    Inflammation of stomach and intestine    Injury of kidney    Irritant contact dermatitis, unspecified cause    Nuclear sclerosis    Viral upper respiratory tract infection      Current Outpatient Medications   Medication Instructions    acetaminophen (8 HOUR PAIN RELIEVER) 650 mg, oral, Every 8 hours PRN, Do not crush, chew, or split.    albuterol 90 mcg/actuation inhaler INHALE TWO PUFFS EVERY 4 HOURS AS NEEDED FOR SHORTNESS OF BREATH OR WHEEZING    aspirin 81 mg, oral, Daily     "atorvastatin (LIPITOR) 40 mg, oral, Nightly    docusate sodium (COLACE) 100 mg, 2 times daily PRN    fluticasone propion-salmeteroL (Advair Diskus) 250-50 mcg/dose diskus inhaler 1 puff, inhalation, 2 times daily RT, Rinse mouth with water after use to reduce aftertaste and incidence of candidiasis. Do not swallow.    FREESTYLE LANCETS MISC Daily    gabapentin (NEURONTIN) 300 mg, oral, Nightly    GaviLyte-C 240-22.72-6.72 -5.84 gram solution     hydroCHLOROthiazide (HYDRODIURIL) 12.5 mg, oral, Daily    hydrocortisone 2.5 % ointment 1 Application    lidocaine (Xylocaine) 5 % ointment Topical, As needed, Apple To Effected Area As Directed    lisinopril 40 mg, oral, Daily    OneTouch Ultra Test strip USE AS DIRECTED.    pantoprazole (PROTONIX) 40 mg, oral, Daily before breakfast    pen needle 1/2\" 29G X 12mm needle Use as instructed    Trulicity 1.5 mg, subcutaneous, Once Weekly      Past Surgical History:   Procedure Laterality Date    CATARACT EXTRACTION  2017    Cataract Extraction    HYSTERECTOMY  2018    Hysterectomy    OTHER SURGICAL HISTORY  2021     section      No Known Allergies   Social History     Tobacco Use    Smoking status: Never    Smokeless tobacco: Never   Substance Use Topics    Alcohol use: Never    Drug use: Never      Family History   Problem Relation Name Age of Onset    Hypertension Mother      Lung cancer Father      Other (Diabetes Mellitus) Sister      Colon cancer Father's Sister      Colon cancer Paternal Grandmother      Breast cancer Other Aunt         Review of Systems  Ten point review of systems negative unless specified in HPI.     Objective   Vitals: /84 (BP Location: Right arm, Patient Position: Sitting, BP Cuff Size: Adult)   Pulse 85   Temp 35.7 °C (96.2 °F) (Temporal)   Ht 1.499 m (4' 11\")   Wt 65 kg (143 lb 6.4 oz)   SpO2 100%   BMI 28.96 kg/m²      Physical Exam  General:  Well developed. Alert. No acute distress.  Skin:  Warm, dry. normal " skin turgor. no rashes. no lesions.   Head: NCAT  EENT: MMM  Cardiovascular:  Regular rate and rhythm, normal S1 and S2, no gallops, no murmurs and no pericardial rub.  Pulmonary:  CTAB in all fields  Abdomen:  (+) BS. soft. non-tender. non-distended. no abdominal masses. no guarding or rigidity.  Neurologic:  grossly intact  Musculoskeletal: moves all extremities spontaneously  Psychiatric:  appropriate mood and affect    Assessment/Plan   64 yo F presenting for follow-up. She endorses SOB at baseline, but has been progressively worse recently. She does have a hx of asthma, but no use of maintenance therapy. Exam not consistent with asthma, but will trial with advair given improvement with albuterol. Echo done 6/2024 with mild to moderate tricuspid regurgitation and mildly elevated RVSP but no evidence of CHF, and no other features on exam/hx to suggest this. However, will obtain BNP to r/o.      Asthma, unspecified asthma severity, unspecified whether complicated, unspecified whether persistent ([facility])  - SpO2 100%, no tachypnea or accessory muscle use, no pathologic lung sounds  -     fluticasone propion-salmeteroL (Advair Diskus) 250-50 mcg/dose diskus inhaler; Inhale 1 puff 2 times a day. Rinse mouth with water after use to reduce aftertaste and incidence of candidiasis. Do not swallow.  -     Complete Pulmonary Function Test (Spirometry/DLCO/Lung Volumes); Future  -     albuterol 2.5 mg /3 mL (0.083 %) nebulizer solution 3 mL  -     albuterol 90 mcg/actuation inhaler 1 puff    Insulin dependent type 2 diabetes mellitus (Multi)  -     Hemoglobin A1c; Future  -     Renal function panel; Future  -     atorvastatin (Lipitor) 40 mg tablet; Take 1 tablet (40 mg) by mouth once daily at bedtime.  -     dulaglutide (Trulicity) 1.5 mg/0.5 mL pen injector injection; Inject 1.5 mg under the skin 1 (one) time per week.  - On ACEi    Dyspnea on exertion  Anemia, unspecified type  -     CBC and Auto Differential; Future  -      B-type natriuretic peptide; Future    Hypertension, unspecified type  -     hydroCHLOROthiazide (HYDRODiuril) 25 mg tablet; Take 0.5 tablets (12.5 mg) by mouth once daily.    Primary hypertension  - /84 today  -     lisinopril 40 mg tablet; Take 1 tablet (40 mg) by mouth once daily.  - Continue hydrochlorothiazide 25 mg daily  Internal carotid artery stenosis, left  -     aspirin 81 mg EC tablet; Take 1 tablet (81 mg) by mouth once daily.    Gastroesophageal reflux disease, unspecified whether esophagitis present  -     pantoprazole (ProtoNix) 40 mg EC tablet; Take 1 tablet (40 mg) by mouth once daily in the morning. Take before meals.    RTC in 2-3 mos, or earlier as needed.  Attending Supervision: discussed with attending physician Dr. Niharika Rodriguez.  Lori Barillas MD  Family Medicine Resident.

## 2025-04-08 DIAGNOSIS — E11.42 DIABETIC POLYNEUROPATHY ASSOCIATED WITH TYPE 2 DIABETES MELLITUS: ICD-10-CM

## 2025-04-08 RX ORDER — GABAPENTIN 300 MG/1
CAPSULE ORAL
Qty: 90 CAPSULE | Refills: 11 | Status: SHIPPED | OUTPATIENT
Start: 2025-04-08

## 2025-05-06 ENCOUNTER — APPOINTMENT (OUTPATIENT)
Facility: HOSPITAL | Age: 65
End: 2025-05-06

## 2025-05-08 DIAGNOSIS — E11.42 DIABETIC POLYNEUROPATHY ASSOCIATED WITH TYPE 2 DIABETES MELLITUS: ICD-10-CM

## 2025-05-24 RX ORDER — GABAPENTIN 300 MG/1
300 CAPSULE ORAL NIGHTLY
Qty: 90 CAPSULE | Refills: 0 | Status: SHIPPED | OUTPATIENT
Start: 2025-05-24

## 2025-05-29 DIAGNOSIS — E11.42 DIABETIC POLYNEUROPATHY ASSOCIATED WITH TYPE 2 DIABETES MELLITUS: ICD-10-CM

## 2025-05-29 RX ORDER — GABAPENTIN 300 MG/1
300 CAPSULE ORAL NIGHTLY
Qty: 90 CAPSULE | Refills: 0 | Status: SHIPPED | OUTPATIENT
Start: 2025-05-29

## 2025-05-29 NOTE — TELEPHONE ENCOUNTER
Pt called refill line back requesting refill to go to [address] No location noted via system, except [address] Call placed to clarify location of dispense. No answer, voicemail left with callback instructions. Pharmacy changed on pending order to reflect [address] [facility] on pt chart, and re-routed to provider.

## 2025-05-29 NOTE — TELEPHONE ENCOUNTER
Pt and [facility] called requesting refill of pt gabapentin. Noted available order written May 24th 2025 for a 90-day supply at [facility]. Call placed to pharmacy X2 to attempt to initiate transfer. No answer, order pended and routed to provider.

## 2025-06-09 ENCOUNTER — OFFICE VISIT (OUTPATIENT)
Facility: HOSPITAL | Age: 65
End: 2025-06-09
Payer: MEDICARE

## 2025-06-09 VITALS
HEART RATE: 77 BPM | SYSTOLIC BLOOD PRESSURE: 168 MMHG | WEIGHT: 143.6 LBS | DIASTOLIC BLOOD PRESSURE: 98 MMHG | OXYGEN SATURATION: 100 % | HEIGHT: 59 IN | BODY MASS INDEX: 28.95 KG/M2 | TEMPERATURE: 96.3 F

## 2025-06-09 DIAGNOSIS — I10 ESSENTIAL HYPERTENSION: Primary | ICD-10-CM

## 2025-06-09 DIAGNOSIS — E11.42 DIABETIC POLYNEUROPATHY ASSOCIATED WITH TYPE 2 DIABETES MELLITUS: ICD-10-CM

## 2025-06-09 DIAGNOSIS — G89.29 CHRONIC MIDLINE LOW BACK PAIN WITHOUT SCIATICA: ICD-10-CM

## 2025-06-09 DIAGNOSIS — M54.50 CHRONIC MIDLINE LOW BACK PAIN WITHOUT SCIATICA: ICD-10-CM

## 2025-06-09 DIAGNOSIS — Z79.4 INSULIN DEPENDENT TYPE 2 DIABETES MELLITUS (MULTI): ICD-10-CM

## 2025-06-09 DIAGNOSIS — E11.9 INSULIN DEPENDENT TYPE 2 DIABETES MELLITUS (MULTI): ICD-10-CM

## 2025-06-09 DIAGNOSIS — R06.02 SOB (SHORTNESS OF BREATH): ICD-10-CM

## 2025-06-09 DIAGNOSIS — I65.22 INTERNAL CAROTID ARTERY STENOSIS, LEFT: ICD-10-CM

## 2025-06-09 PROCEDURE — 99213 OFFICE O/P EST LOW 20 MIN: CPT | Performed by: FAMILY MEDICINE

## 2025-06-09 PROCEDURE — 3080F DIAST BP >= 90 MM HG: CPT | Performed by: FAMILY MEDICINE

## 2025-06-09 PROCEDURE — 1125F AMNT PAIN NOTED PAIN PRSNT: CPT | Performed by: FAMILY MEDICINE

## 2025-06-09 PROCEDURE — G2211 COMPLEX E/M VISIT ADD ON: HCPCS | Performed by: FAMILY MEDICINE

## 2025-06-09 PROCEDURE — 3077F SYST BP >= 140 MM HG: CPT | Performed by: FAMILY MEDICINE

## 2025-06-09 PROCEDURE — 1159F MED LIST DOCD IN RCRD: CPT | Performed by: FAMILY MEDICINE

## 2025-06-09 PROCEDURE — 3008F BODY MASS INDEX DOCD: CPT | Performed by: FAMILY MEDICINE

## 2025-06-09 RX ORDER — ASPIRIN 81 MG/1
81 TABLET ORAL DAILY
Qty: 90 TABLET | Refills: 3 | Status: SHIPPED | OUTPATIENT
Start: 2025-06-09 | End: 2026-06-09

## 2025-06-09 RX ORDER — ATORVASTATIN CALCIUM 40 MG/1
40 TABLET, FILM COATED ORAL NIGHTLY
Qty: 90 TABLET | Refills: 3 | Status: SHIPPED | OUTPATIENT
Start: 2025-06-09

## 2025-06-09 RX ORDER — CHLORTHALIDONE 25 MG/1
25 TABLET ORAL DAILY
Qty: 30 TABLET | Refills: 11 | Status: SHIPPED | OUTPATIENT
Start: 2025-06-09 | End: 2026-06-09

## 2025-06-09 RX ORDER — DEXTROMETHORPHAN HYDROBROMIDE, GUAIFENESIN 5; 100 MG/5ML; MG/5ML
650 LIQUID ORAL EVERY 8 HOURS PRN
Qty: 30 TABLET | Refills: 3 | Status: SHIPPED | OUTPATIENT
Start: 2025-06-09

## 2025-06-09 RX ORDER — GABAPENTIN 300 MG/1
300 CAPSULE ORAL NIGHTLY
Qty: 90 CAPSULE | Refills: 0 | Status: SHIPPED | OUTPATIENT
Start: 2025-06-09

## 2025-06-09 ASSESSMENT — PAIN SCALES - GENERAL: PAINLEVEL_OUTOF10: 10-WORST PAIN EVER

## 2025-06-09 NOTE — PROGRESS NOTES
"Subjective   Patient ID: Mari Dowling is a 65 y.o. female who presents for Follow-up and Med Refill.    Ms. Dowling is here for refill of her antihypertensives.  She complains of bilateral shin and ankle pain. No history of trauma and no reported swelling. Had taken amlodipine but caused swelling. Had side effects with hydrochlorothiazide and lisinopril as well. She generally feels well otherwise with no dyspnea, chest discomfort or other complaints.     Med Refill       Review of Systems    Objective   BP (!) 168/98 (BP Location: Right arm, Patient Position: Sitting, BP Cuff Size: Adult)   Pulse 77   Temp 35.7 °C (96.3 °F) (Temporal)   Ht 1.499 m (4' 11\")   Wt 65.1 kg (143 lb 9.6 oz)   SpO2 100%   BMI 29.00 kg/m²     Physical Exam  Constitutional:       Appearance: Normal appearance.   Cardiovascular:      Rate and Rhythm: Normal rate.      Heart sounds: Normal heart sounds.      Comments: No edema or tenderness over ankles or shins bilaterally.   Pulmonary:      Breath sounds: Normal breath sounds.   Musculoskeletal:      Right lower leg: No edema.      Left lower leg: No edema.     Assessment/Plan :  Switched her to chlorthalidone for hypertension. Refilled her other chronic meds. FU 1-2 months for followup of hypertension.   Diagnoses and all orders for this visit:  Essential hypertension  -     chlorthalidone (Hygroton) 25 mg tablet; Take 1 tablet (25 mg) by mouth once daily.  Internal carotid artery stenosis, left  -     aspirin 81 mg EC tablet; Take 1 tablet (81 mg) by mouth once daily.  SOB (shortness of breath)  Chronic midline low back pain without sciatica  -     acetaminophen (8 Hour Pain Reliever) 650 mg ER tablet; Take 1 tablet (650 mg) by mouth every 8 hours if needed for mild pain (1 - 3). Do not crush, chew, or split.  Diabetic polyneuropathy associated with type 2 diabetes mellitus  -     gabapentin (Neurontin) 300 mg capsule; Take 1 capsule (300 mg) by mouth once daily at bedtime.  Insulin " dependent type 2 diabetes mellitus (Multi)  -     atorvastatin (Lipitor) 40 mg tablet; Take 1 tablet (40 mg) by mouth once daily at bedtime.

## 2025-07-11 ENCOUNTER — APPOINTMENT (OUTPATIENT)
Dept: OPHTHALMOLOGY | Facility: CLINIC | Age: 65
End: 2025-07-11
Payer: COMMERCIAL